# Patient Record
Sex: FEMALE | Race: WHITE | NOT HISPANIC OR LATINO | Employment: FULL TIME | URBAN - METROPOLITAN AREA
[De-identification: names, ages, dates, MRNs, and addresses within clinical notes are randomized per-mention and may not be internally consistent; named-entity substitution may affect disease eponyms.]

---

## 2018-12-03 ENCOUNTER — OFFICE VISIT (OUTPATIENT)
Dept: ORTHOPEDICS | Facility: CLINIC | Age: 23
End: 2018-12-03
Payer: COMMERCIAL

## 2018-12-03 VITALS
WEIGHT: 140 LBS | HEIGHT: 64 IN | HEART RATE: 91 BPM | DIASTOLIC BLOOD PRESSURE: 80 MMHG | BODY MASS INDEX: 23.9 KG/M2 | SYSTOLIC BLOOD PRESSURE: 134 MMHG

## 2018-12-03 DIAGNOSIS — M54.16 LUMBAR RADICULITIS: Primary | ICD-10-CM

## 2018-12-03 DIAGNOSIS — S39.012S STRAIN OF LUMBAR REGION, SEQUELA: ICD-10-CM

## 2018-12-03 PROCEDURE — 72100 X-RAY EXAM L-S SPINE 2/3 VWS: CPT | Mod: ,,, | Performed by: ORTHOPAEDIC SURGERY

## 2018-12-03 PROCEDURE — 72170 X-RAY EXAM OF PELVIS: CPT | Mod: ,,, | Performed by: ORTHOPAEDIC SURGERY

## 2018-12-03 PROCEDURE — 99203 OFFICE O/P NEW LOW 30 MIN: CPT | Mod: ,,, | Performed by: ORTHOPAEDIC SURGERY

## 2018-12-03 RX ORDER — OXAPROZIN 600 MG/1
600 TABLET, FILM COATED ORAL 2 TIMES DAILY
Qty: 40 TABLET | Refills: 0 | Status: SHIPPED | OUTPATIENT
Start: 2018-12-03 | End: 2018-12-23

## 2018-12-03 NOTE — PROGRESS NOTES
Subjective:       Patient ID: Kelsey Doll is a 23 y.o. female.    Chief Complaint: Pain of the Lower Back (Lumbar pain since the end of September. Beginning of October she picked up a heavy pumpkin and felt pain. States pain got worse yesterday. Pain radiates to both sides of back. Has left leg pain, started yesterday. Pain went to left calf yesterday)      History of Present Illness  Acute onset of low back pain about a month ago that occasionally rarely radiates to left leg she is only taken some anti-inflammatories occasionally    Current Medications  No current outpatient medications on file.     No current facility-administered medications for this visit.        Allergies  Review of patient's allergies indicates:  No Known Allergies    Past Medical History  History reviewed. No pertinent past medical history.    Surgical History  History reviewed. No pertinent surgical history.    Family History:   History reviewed. No pertinent family history.    Social History:   Social History     Socioeconomic History    Marital status: Single     Spouse name: Not on file    Number of children: Not on file    Years of education: Not on file    Highest education level: Not on file   Social Needs    Financial resource strain: Not on file    Food insecurity - worry: Not on file    Food insecurity - inability: Not on file    Transportation needs - medical: Not on file    Transportation needs - non-medical: Not on file   Occupational History    Not on file   Tobacco Use    Smoking status: Never Smoker    Smokeless tobacco: Never Used   Substance and Sexual Activity    Alcohol use: Yes    Drug use: Not on file    Sexual activity: Not on file   Other Topics Concern    Not on file   Social History Narrative    Not on file       Hospitalization/Major Diagnostic Procedure:     Review of Systems     General/Constitutional:  Chills denies. Fatigue denies. Fever denies. Weight gain denies. Weight loss  denies.    Respiratory:  Shortness of breath denies.    Cardiovascular:  Chest pain denies.    Gastrointestinal:  Constipation denies. Diarrhea denies. Nausea denies. Vomiting denies.     Hematology:  Easy bruising denies. Prolonged bleeding denies.     Genitourinary:  Frequent urination denies. Pain in lower back denies. Painful urination denies.     Musculoskeletal:  See HPI for details    Skin:  Rash denies.    Neurologic:  Dizziness denies. Gait abnormalities denies. Seizures denies. Tingling/Numbess denies.    Psychiatric:  Anxiety denies. Depressed mood denies.     Objective:   Vital Signs:   Vitals:    12/03/18 0838   BP: 134/80   Pulse: 91        Physical Exam      General Examination:     Constitutional: The patient is alert and oriented to lace person and time. Mood is pleasant.     Head/Face: Normal facial features normal eyebrows    Eyes: Normal extraocular motion bilaterally    Lungs: Respirations are equal and unlabored    Gait is coordinated.    Cardiovascular: There are no swelling or varicosities present.    Lymphatic: Negative for adenopathy    Skin: Normal    Neurological: Level of consciousness normal. Oriented to place person and time and situation    Psychiatric: Oriented to time place person and situation    Tenderness of the left posterior leg spine without spasm and a mild restriction of motion straight leg raising is negative Felix test is negative    XRAY Report/ Interpretation : AP and lateral x-rays of lumbar spine will performed today. Indications low back pain. Findings: Findings appear normal  AP pelvis x-ray was taken today. Indications low back pain and/or hip pain. Findings AP pelvis x-ray appears to be normal with no evidence of fractures or significant degenerative disease      Assessment:       1. Lumbar radiculitis    2. Strain of lumbar region, sequela        Plan:       Kelsey was seen today for pain.    Diagnoses and all orders for this visit:    Lumbar radiculitis  -      X-Ray Lumbar Spine Ap And Lateral  -     X-Ray Pelvis Routine AP    Strain of lumbar region, sequela         No Follow-up on file.    Trilaminar cans and extension exercises and prescription anti-inflammatory advised returns for assessment one week    This note was created using Dragon voice recognition software that occasionally misinterpreted phrases or words.

## 2021-08-11 ENCOUNTER — HOSPITAL ENCOUNTER (EMERGENCY)
Facility: HOSPITAL | Age: 26
Discharge: HOME | End: 2021-08-11
Attending: EMERGENCY MEDICINE
Payer: COMMERCIAL

## 2021-08-11 VITALS
WEIGHT: 140 LBS | SYSTOLIC BLOOD PRESSURE: 154 MMHG | TEMPERATURE: 98 F | HEIGHT: 64 IN | HEART RATE: 64 BPM | OXYGEN SATURATION: 100 % | BODY MASS INDEX: 23.9 KG/M2 | RESPIRATION RATE: 18 BRPM | DIASTOLIC BLOOD PRESSURE: 91 MMHG

## 2021-08-11 DIAGNOSIS — T14.8XXA ABRASION: ICD-10-CM

## 2021-08-11 DIAGNOSIS — S52.124A CLOSED NONDISPLACED FRACTURE OF HEAD OF RIGHT RADIUS, INITIAL ENCOUNTER: Primary | ICD-10-CM

## 2021-08-11 LAB
B-HCG UR QL: NEGATIVE
CTP QC/QA: YES

## 2021-08-11 PROCEDURE — 81025 URINE PREGNANCY TEST: CPT | Performed by: EMERGENCY MEDICINE

## 2021-08-11 PROCEDURE — 25000003 PHARM REV CODE 250: Performed by: EMERGENCY MEDICINE

## 2021-08-11 PROCEDURE — 99283 EMERGENCY DEPT VISIT LOW MDM: CPT

## 2021-08-11 RX ORDER — ONDANSETRON 4 MG/1
4 TABLET, FILM COATED ORAL EVERY 6 HOURS PRN
Qty: 12 TABLET | Refills: 0 | Status: ON HOLD | OUTPATIENT
Start: 2021-08-11 | End: 2022-06-20 | Stop reason: HOSPADM

## 2021-08-11 RX ORDER — HYDROCODONE BITARTRATE AND ACETAMINOPHEN 5; 325 MG/1; MG/1
1 TABLET ORAL EVERY 8 HOURS PRN
Qty: 8 TABLET | Refills: 0 | Status: ON HOLD | OUTPATIENT
Start: 2021-08-11 | End: 2022-06-20 | Stop reason: HOSPADM

## 2021-08-11 RX ORDER — ONDANSETRON 4 MG/1
4 TABLET, FILM COATED ORAL EVERY 6 HOURS
Qty: 12 TABLET | Refills: 0 | Status: SHIPPED | OUTPATIENT
Start: 2021-08-11 | End: 2021-08-11 | Stop reason: SDUPTHER

## 2021-08-11 RX ORDER — CEPHALEXIN 500 MG/1
500 CAPSULE ORAL 4 TIMES DAILY
Qty: 28 CAPSULE | Refills: 0 | Status: SHIPPED | OUTPATIENT
Start: 2021-08-11 | End: 2021-08-18

## 2021-08-11 RX ADMIN — BACITRACIN ZINC, NEOMYCIN, POLYMYXIN B: 400; 3.5; 5 OINTMENT TOPICAL at 05:08

## 2021-08-12 ENCOUNTER — OFFICE VISIT (OUTPATIENT)
Dept: ORTHOPEDICS | Facility: CLINIC | Age: 26
End: 2021-08-12
Payer: COMMERCIAL

## 2021-08-12 VITALS — WEIGHT: 140 LBS | BODY MASS INDEX: 23.9 KG/M2 | HEIGHT: 64 IN | RESPIRATION RATE: 18 BRPM

## 2021-08-12 DIAGNOSIS — S52.134A CLOSED NONDISPLACED FRACTURE OF NECK OF RIGHT RADIUS, INITIAL ENCOUNTER: Primary | ICD-10-CM

## 2021-08-12 PROCEDURE — 3008F BODY MASS INDEX DOCD: CPT | Mod: CPTII,S$GLB,, | Performed by: ORTHOPAEDIC SURGERY

## 2021-08-12 PROCEDURE — 1159F MED LIST DOCD IN RCRD: CPT | Mod: CPTII,S$GLB,, | Performed by: ORTHOPAEDIC SURGERY

## 2021-08-12 PROCEDURE — 1160F RVW MEDS BY RX/DR IN RCRD: CPT | Mod: CPTII,S$GLB,, | Performed by: ORTHOPAEDIC SURGERY

## 2021-08-12 PROCEDURE — 99999 PR PBB SHADOW E&M-NEW PATIENT-LVL III: ICD-10-PCS | Mod: PBBFAC,,, | Performed by: ORTHOPAEDIC SURGERY

## 2021-08-12 PROCEDURE — 1125F PR PAIN SEVERITY QUANTIFIED, PAIN PRESENT: ICD-10-PCS | Mod: CPTII,S$GLB,, | Performed by: ORTHOPAEDIC SURGERY

## 2021-08-12 PROCEDURE — 1160F PR REVIEW ALL MEDS BY PRESCRIBER/CLIN PHARMACIST DOCUMENTED: ICD-10-PCS | Mod: CPTII,S$GLB,, | Performed by: ORTHOPAEDIC SURGERY

## 2021-08-12 PROCEDURE — 1159F PR MEDICATION LIST DOCUMENTED IN MEDICAL RECORD: ICD-10-PCS | Mod: CPTII,S$GLB,, | Performed by: ORTHOPAEDIC SURGERY

## 2021-08-12 PROCEDURE — 1125F AMNT PAIN NOTED PAIN PRSNT: CPT | Mod: CPTII,S$GLB,, | Performed by: ORTHOPAEDIC SURGERY

## 2021-08-12 PROCEDURE — 99203 OFFICE O/P NEW LOW 30 MIN: CPT | Mod: S$GLB,,, | Performed by: ORTHOPAEDIC SURGERY

## 2021-08-12 PROCEDURE — 99203 PR OFFICE/OUTPT VISIT, NEW, LEVL III, 30-44 MIN: ICD-10-PCS | Mod: S$GLB,,, | Performed by: ORTHOPAEDIC SURGERY

## 2021-08-12 PROCEDURE — 99999 PR PBB SHADOW E&M-NEW PATIENT-LVL III: CPT | Mod: PBBFAC,,, | Performed by: ORTHOPAEDIC SURGERY

## 2021-08-12 PROCEDURE — 3008F PR BODY MASS INDEX (BMI) DOCUMENTED: ICD-10-PCS | Mod: CPTII,S$GLB,, | Performed by: ORTHOPAEDIC SURGERY

## 2021-09-10 DIAGNOSIS — S52.134A CLOSED NONDISPLACED FRACTURE OF NECK OF RIGHT RADIUS, INITIAL ENCOUNTER: Primary | ICD-10-CM

## 2021-09-13 ENCOUNTER — HOSPITAL ENCOUNTER (OUTPATIENT)
Dept: RADIOLOGY | Facility: HOSPITAL | Age: 26
Discharge: HOME | End: 2021-09-13
Attending: ORTHOPAEDIC SURGERY
Payer: COMMERCIAL

## 2021-09-13 ENCOUNTER — OFFICE VISIT (OUTPATIENT)
Dept: ORTHOPEDICS | Facility: CLINIC | Age: 26
End: 2021-09-13
Payer: COMMERCIAL

## 2021-09-13 VITALS — WEIGHT: 140 LBS | HEIGHT: 64 IN | RESPIRATION RATE: 18 BRPM | BODY MASS INDEX: 23.9 KG/M2

## 2021-09-13 DIAGNOSIS — S52.134A CLOSED NONDISPLACED FRACTURE OF NECK OF RIGHT RADIUS, INITIAL ENCOUNTER: ICD-10-CM

## 2021-09-13 DIAGNOSIS — S52.134D CLOSED NONDISPLACED FRACTURE OF NECK OF RIGHT RADIUS WITH ROUTINE HEALING, SUBSEQUENT ENCOUNTER: Primary | ICD-10-CM

## 2021-09-13 PROCEDURE — 73080 X-RAY EXAM OF ELBOW: CPT | Mod: TC,PN,RT

## 2021-09-13 PROCEDURE — 1160F RVW MEDS BY RX/DR IN RCRD: CPT | Mod: CPTII,S$GLB,, | Performed by: ORTHOPAEDIC SURGERY

## 2021-09-13 PROCEDURE — 1159F MED LIST DOCD IN RCRD: CPT | Mod: CPTII,S$GLB,, | Performed by: ORTHOPAEDIC SURGERY

## 2021-09-13 PROCEDURE — 73080 X-RAY EXAM OF ELBOW: CPT | Mod: 26,RT,, | Performed by: RADIOLOGY

## 2021-09-13 PROCEDURE — 99213 OFFICE O/P EST LOW 20 MIN: CPT | Mod: S$GLB,,, | Performed by: ORTHOPAEDIC SURGERY

## 2021-09-13 PROCEDURE — 1159F PR MEDICATION LIST DOCUMENTED IN MEDICAL RECORD: ICD-10-PCS | Mod: CPTII,S$GLB,, | Performed by: ORTHOPAEDIC SURGERY

## 2021-09-13 PROCEDURE — 73080 XR ELBOW COMPLETE 3 VIEW RIGHT: ICD-10-PCS | Mod: 26,RT,, | Performed by: RADIOLOGY

## 2021-09-13 PROCEDURE — 99999 PR PBB SHADOW E&M-EST. PATIENT-LVL III: ICD-10-PCS | Mod: PBBFAC,,, | Performed by: ORTHOPAEDIC SURGERY

## 2021-09-13 PROCEDURE — 1160F PR REVIEW ALL MEDS BY PRESCRIBER/CLIN PHARMACIST DOCUMENTED: ICD-10-PCS | Mod: CPTII,S$GLB,, | Performed by: ORTHOPAEDIC SURGERY

## 2021-09-13 PROCEDURE — 99213 PR OFFICE/OUTPT VISIT, EST, LEVL III, 20-29 MIN: ICD-10-PCS | Mod: S$GLB,,, | Performed by: ORTHOPAEDIC SURGERY

## 2021-09-13 PROCEDURE — 3008F PR BODY MASS INDEX (BMI) DOCUMENTED: ICD-10-PCS | Mod: CPTII,S$GLB,, | Performed by: ORTHOPAEDIC SURGERY

## 2021-09-13 PROCEDURE — 99999 PR PBB SHADOW E&M-EST. PATIENT-LVL III: CPT | Mod: PBBFAC,,, | Performed by: ORTHOPAEDIC SURGERY

## 2021-09-13 PROCEDURE — 3008F BODY MASS INDEX DOCD: CPT | Mod: CPTII,S$GLB,, | Performed by: ORTHOPAEDIC SURGERY

## 2021-11-10 LAB
ABO + RH BLD: NORMAL
C TRACH RRNA SPEC QL PROBE: NEGATIVE
HBV SURFACE AG SERPL QL IA: NEGATIVE
HCT VFR BLD AUTO: 44.5 % (ref 36–46)
HGB BLD-MCNC: 14.7 G/DL (ref 12–16)
HIV 1+2 AB+HIV1 P24 AG SERPL QL IA: NEGATIVE
INDIRECT COOMBS: NEGATIVE
N GONORRHOEAE, AMPLIFIED DNA: NEGATIVE
RPR: NORMAL
RUBELLA IMMUNE STATUS: NORMAL

## 2022-04-07 LAB
HCT VFR BLD AUTO: 35.9 % (ref 36–46)
HGB BLD-MCNC: 12.1 G/DL (ref 12–16)

## 2022-04-13 ENCOUNTER — HOSPITAL ENCOUNTER (OUTPATIENT)
Facility: HOSPITAL | Age: 27
Discharge: HOME | End: 2022-04-13
Attending: STUDENT IN AN ORGANIZED HEALTH CARE EDUCATION/TRAINING PROGRAM | Admitting: SPECIALIST
Payer: COMMERCIAL

## 2022-04-13 VITALS
BODY MASS INDEX: 31.41 KG/M2 | RESPIRATION RATE: 18 BRPM | TEMPERATURE: 98 F | HEART RATE: 88 BPM | HEIGHT: 64 IN | WEIGHT: 184 LBS | SYSTOLIC BLOOD PRESSURE: 115 MMHG | DIASTOLIC BLOOD PRESSURE: 74 MMHG | OXYGEN SATURATION: 99 %

## 2022-04-13 DIAGNOSIS — O36.8190 DECREASED FETAL MOVEMENT: ICD-10-CM

## 2022-04-13 PROCEDURE — 76819 FETAL BIOPHYS PROFIL W/O NST: CPT

## 2022-04-14 NOTE — NURSING
Patient presents with c/o decreased fetal movement. Patient states that she has felt very light and sluggish like movement that is abnormal from fetal routine. Gross fetal movement palpated.Strip reactive with kick counts. BPP performed 8/8. D/c home with education.

## 2022-06-09 ENCOUNTER — HOSPITAL ENCOUNTER (OUTPATIENT)
Facility: HOSPITAL | Age: 27
Discharge: HOME | End: 2022-06-09
Attending: STUDENT IN AN ORGANIZED HEALTH CARE EDUCATION/TRAINING PROGRAM | Admitting: STUDENT IN AN ORGANIZED HEALTH CARE EDUCATION/TRAINING PROGRAM
Payer: COMMERCIAL

## 2022-06-09 VITALS — DIASTOLIC BLOOD PRESSURE: 75 MMHG | HEART RATE: 91 BPM | SYSTOLIC BLOOD PRESSURE: 122 MMHG

## 2022-06-09 DIAGNOSIS — O16.9 ELEVATED BLOOD PRESSURE AFFECTING PREGNANCY, ANTEPARTUM: ICD-10-CM

## 2022-06-09 LAB
ALBUMIN SERPL BCP-MCNC: 3.1 G/DL (ref 3.5–5.2)
ALP SERPL-CCNC: 79 U/L (ref 55–135)
ALT SERPL W/O P-5'-P-CCNC: 21 U/L (ref 10–44)
ANION GAP SERPL CALC-SCNC: 11 MMOL/L (ref 8–16)
AST SERPL-CCNC: 23 U/L (ref 10–40)
BACTERIA #/AREA URNS HPF: NEGATIVE /HPF
BILIRUB SERPL-MCNC: 0.3 MG/DL (ref 0.1–1)
BILIRUB UR QL STRIP: NEGATIVE
BUN SERPL-MCNC: 6 MG/DL (ref 6–20)
CALCIUM SERPL-MCNC: 9.3 MG/DL (ref 8.7–10.5)
CHLORIDE SERPL-SCNC: 104 MMOL/L (ref 95–110)
CLARITY UR: CLEAR
CO2 SERPL-SCNC: 21 MMOL/L (ref 23–29)
COLOR UR: COLORLESS
CREAT SERPL-MCNC: 0.5 MG/DL (ref 0.5–1.4)
ERYTHROCYTE [DISTWIDTH] IN BLOOD BY AUTOMATED COUNT: 13.2 % (ref 11.5–14.5)
EST. GFR  (AFRICAN AMERICAN): >60 ML/MIN/1.73 M^2
EST. GFR  (NON AFRICAN AMERICAN): >60 ML/MIN/1.73 M^2
GLUCOSE SERPL-MCNC: 83 MG/DL (ref 70–110)
GLUCOSE UR QL STRIP: NEGATIVE
HCT VFR BLD AUTO: 34.4 % (ref 37–48.5)
HGB BLD-MCNC: 11.8 G/DL (ref 12–16)
HGB UR QL STRIP: NEGATIVE
HYALINE CASTS #/AREA URNS LPF: 3 /LPF
KETONES UR QL STRIP: NEGATIVE
LDH SERPL L TO P-CCNC: 140 U/L (ref 110–260)
LEUKOCYTE ESTERASE UR QL STRIP: ABNORMAL
MCH RBC QN AUTO: 30.6 PG (ref 27–31)
MCHC RBC AUTO-ENTMCNC: 34.3 G/DL (ref 32–36)
MCV RBC AUTO: 89 FL (ref 82–98)
MICROSCOPIC COMMENT: ABNORMAL
NITRITE UR QL STRIP: NEGATIVE
PH UR STRIP: 8 [PH] (ref 5–8)
PLATELET # BLD AUTO: 229 K/UL (ref 150–450)
PMV BLD AUTO: 9.5 FL (ref 9.2–12.9)
POTASSIUM SERPL-SCNC: 3.6 MMOL/L (ref 3.5–5.1)
PROT SERPL-MCNC: 6.2 G/DL (ref 6–8.4)
PROT UR QL STRIP: NEGATIVE
RBC # BLD AUTO: 3.86 M/UL (ref 4–5.4)
RBC #/AREA URNS HPF: 1 /HPF (ref 0–4)
SODIUM SERPL-SCNC: 136 MMOL/L (ref 136–145)
SP GR UR STRIP: 1 (ref 1–1.03)
SQUAMOUS #/AREA URNS HPF: 5 /HPF
URATE SERPL-MCNC: 3.7 MG/DL (ref 2.4–5.7)
URN SPEC COLLECT METH UR: ABNORMAL
UROBILINOGEN UR STRIP-ACNC: NEGATIVE EU/DL
WBC # BLD AUTO: 13.79 K/UL (ref 3.9–12.7)
WBC #/AREA URNS HPF: 3 /HPF (ref 0–5)

## 2022-06-09 PROCEDURE — 59025 FETAL NON-STRESS TEST: CPT

## 2022-06-09 PROCEDURE — 85027 COMPLETE CBC AUTOMATED: CPT | Performed by: STUDENT IN AN ORGANIZED HEALTH CARE EDUCATION/TRAINING PROGRAM

## 2022-06-09 PROCEDURE — 36415 COLL VENOUS BLD VENIPUNCTURE: CPT | Performed by: STUDENT IN AN ORGANIZED HEALTH CARE EDUCATION/TRAINING PROGRAM

## 2022-06-09 PROCEDURE — 81001 URINALYSIS AUTO W/SCOPE: CPT | Performed by: STUDENT IN AN ORGANIZED HEALTH CARE EDUCATION/TRAINING PROGRAM

## 2022-06-09 PROCEDURE — 83615 LACTATE (LD) (LDH) ENZYME: CPT | Performed by: STUDENT IN AN ORGANIZED HEALTH CARE EDUCATION/TRAINING PROGRAM

## 2022-06-09 PROCEDURE — 80053 COMPREHEN METABOLIC PANEL: CPT | Performed by: STUDENT IN AN ORGANIZED HEALTH CARE EDUCATION/TRAINING PROGRAM

## 2022-06-09 PROCEDURE — 84550 ASSAY OF BLOOD/URIC ACID: CPT | Performed by: STUDENT IN AN ORGANIZED HEALTH CARE EDUCATION/TRAINING PROGRAM

## 2022-06-10 ENCOUNTER — HOSPITAL ENCOUNTER (OUTPATIENT)
Facility: HOSPITAL | Age: 27
Discharge: HOME | End: 2022-06-10
Attending: STUDENT IN AN ORGANIZED HEALTH CARE EDUCATION/TRAINING PROGRAM | Admitting: STUDENT IN AN ORGANIZED HEALTH CARE EDUCATION/TRAINING PROGRAM
Payer: COMMERCIAL

## 2022-06-10 VITALS — HEART RATE: 83 BPM | DIASTOLIC BLOOD PRESSURE: 80 MMHG | SYSTOLIC BLOOD PRESSURE: 123 MMHG

## 2022-06-10 DIAGNOSIS — O16.9 HYPERTENSION AFFECTING PREGNANCY: ICD-10-CM

## 2022-06-10 PROCEDURE — 59025 FETAL NON-STRESS TEST: CPT

## 2022-06-14 LAB
PRENATAL STREP B CULTURE: NEGATIVE
PRENATAL STREP B CULTURE: POSITIVE

## 2022-06-16 ENCOUNTER — HOSPITAL ENCOUNTER (INPATIENT)
Facility: HOSPITAL | Age: 27
LOS: 4 days | Discharge: HOME | End: 2022-06-20
Attending: STUDENT IN AN ORGANIZED HEALTH CARE EDUCATION/TRAINING PROGRAM | Admitting: STUDENT IN AN ORGANIZED HEALTH CARE EDUCATION/TRAINING PROGRAM
Payer: COMMERCIAL

## 2022-06-16 DIAGNOSIS — Z34.90 ENCOUNTER FOR INDUCTION OF LABOR: ICD-10-CM

## 2022-06-16 LAB
ABO + RH BLD: NORMAL
ALBUMIN SERPL BCP-MCNC: 3.3 G/DL (ref 3.5–5.2)
ALP SERPL-CCNC: 88 U/L (ref 55–135)
ALT SERPL W/O P-5'-P-CCNC: 32 U/L (ref 10–44)
AMPHET+METHAMPHET UR QL: NEGATIVE
ANION GAP SERPL CALC-SCNC: 10 MMOL/L (ref 8–16)
AST SERPL-CCNC: 29 U/L (ref 10–40)
BACTERIA #/AREA URNS HPF: ABNORMAL /HPF
BARBITURATES UR QL SCN>200 NG/ML: NEGATIVE
BASOPHILS # BLD AUTO: 0.03 K/UL (ref 0–0.2)
BASOPHILS NFR BLD: 0.3 % (ref 0–1.9)
BENZODIAZ UR QL SCN>200 NG/ML: NEGATIVE
BILIRUB SERPL-MCNC: 0.2 MG/DL (ref 0.1–1)
BILIRUB UR QL STRIP: NEGATIVE
BLD GP AB SCN CELLS X3 SERPL QL: NORMAL
BUN SERPL-MCNC: 7 MG/DL (ref 6–20)
BUPRENORPHINE UR QL: NEGATIVE
BZE UR QL SCN: NEGATIVE
CALCIUM SERPL-MCNC: 8.8 MG/DL (ref 8.7–10.5)
CANNABINOIDS UR QL SCN: NEGATIVE
CHLORIDE SERPL-SCNC: 103 MMOL/L (ref 95–110)
CLARITY UR: ABNORMAL
CO2 SERPL-SCNC: 21 MMOL/L (ref 23–29)
COLOR UR: YELLOW
CREAT SERPL-MCNC: 0.5 MG/DL (ref 0.5–1.4)
CREAT UR-MCNC: 23 MG/DL (ref 15–325)
CREAT UR-MCNC: 23 MG/DL (ref 15–325)
DIFFERENTIAL METHOD: ABNORMAL
EOSINOPHIL # BLD AUTO: 0.1 K/UL (ref 0–0.5)
EOSINOPHIL NFR BLD: 0.8 % (ref 0–8)
ERYTHROCYTE [DISTWIDTH] IN BLOOD BY AUTOMATED COUNT: 13.1 % (ref 11.5–14.5)
EST. GFR  (AFRICAN AMERICAN): >60 ML/MIN/1.73 M^2
EST. GFR  (NON AFRICAN AMERICAN): >60 ML/MIN/1.73 M^2
GLUCOSE SERPL-MCNC: 108 MG/DL (ref 70–110)
GLUCOSE UR QL STRIP: NEGATIVE
HCT VFR BLD AUTO: 33.9 % (ref 37–48.5)
HGB BLD-MCNC: 11.8 G/DL (ref 12–16)
HGB UR QL STRIP: NEGATIVE
HYALINE CASTS #/AREA URNS LPF: 7 /LPF
IMM GRANULOCYTES # BLD AUTO: 0.04 K/UL (ref 0–0.04)
IMM GRANULOCYTES NFR BLD AUTO: 0.3 % (ref 0–0.5)
KETONES UR QL STRIP: NEGATIVE
LEUKOCYTE ESTERASE UR QL STRIP: ABNORMAL
LYMPHOCYTES # BLD AUTO: 2.8 K/UL (ref 1–4.8)
LYMPHOCYTES NFR BLD: 23.7 % (ref 18–48)
MCH RBC QN AUTO: 31.1 PG (ref 27–31)
MCHC RBC AUTO-ENTMCNC: 34.8 G/DL (ref 32–36)
MCV RBC AUTO: 89 FL (ref 82–98)
MICROSCOPIC COMMENT: ABNORMAL
MONOCYTES # BLD AUTO: 0.8 K/UL (ref 0.3–1)
MONOCYTES NFR BLD: 6.6 % (ref 4–15)
NEUTROPHILS # BLD AUTO: 8.2 K/UL (ref 1.8–7.7)
NEUTROPHILS NFR BLD: 68.3 % (ref 38–73)
NITRITE UR QL STRIP: NEGATIVE
NRBC BLD-RTO: 0 /100 WBC
OPIATES UR QL SCN: NEGATIVE
PCP UR QL SCN>25 NG/ML: NEGATIVE
PH UR STRIP: 8 [PH] (ref 5–8)
PLATELET # BLD AUTO: 232 K/UL (ref 150–450)
PMV BLD AUTO: 9.8 FL (ref 9.2–12.9)
POTASSIUM SERPL-SCNC: 3.3 MMOL/L (ref 3.5–5.1)
PROT SERPL-MCNC: 6.4 G/DL (ref 6–8.4)
PROT UR QL STRIP: NEGATIVE
PROT UR-MCNC: <6 MG/DL (ref 6–15)
PROT/CREAT UR: NORMAL MG/G{CREAT} (ref 0–0.2)
RBC # BLD AUTO: 3.8 M/UL (ref 4–5.4)
RBC #/AREA URNS HPF: 1 /HPF (ref 0–4)
SARS-COV-2 RDRP RESP QL NAA+PROBE: NEGATIVE
SODIUM SERPL-SCNC: 134 MMOL/L (ref 136–145)
SP GR UR STRIP: 1 (ref 1–1.03)
SQUAMOUS #/AREA URNS HPF: 10 /HPF
TOXICOLOGY INFORMATION: NORMAL
URN SPEC COLLECT METH UR: ABNORMAL
UROBILINOGEN UR STRIP-ACNC: NEGATIVE EU/DL
WBC # BLD AUTO: 11.96 K/UL (ref 3.9–12.7)
WBC #/AREA URNS HPF: 10 /HPF (ref 0–5)

## 2022-06-16 PROCEDURE — 85025 COMPLETE CBC W/AUTO DIFF WBC: CPT | Performed by: STUDENT IN AN ORGANIZED HEALTH CARE EDUCATION/TRAINING PROGRAM

## 2022-06-16 PROCEDURE — 84156 ASSAY OF PROTEIN URINE: CPT | Performed by: STUDENT IN AN ORGANIZED HEALTH CARE EDUCATION/TRAINING PROGRAM

## 2022-06-16 PROCEDURE — U0002 COVID-19 LAB TEST NON-CDC: HCPCS | Performed by: STUDENT IN AN ORGANIZED HEALTH CARE EDUCATION/TRAINING PROGRAM

## 2022-06-16 PROCEDURE — 86592 SYPHILIS TEST NON-TREP QUAL: CPT | Performed by: STUDENT IN AN ORGANIZED HEALTH CARE EDUCATION/TRAINING PROGRAM

## 2022-06-16 PROCEDURE — 80053 COMPREHEN METABOLIC PANEL: CPT | Performed by: STUDENT IN AN ORGANIZED HEALTH CARE EDUCATION/TRAINING PROGRAM

## 2022-06-16 PROCEDURE — 86901 BLOOD TYPING SEROLOGIC RH(D): CPT | Performed by: STUDENT IN AN ORGANIZED HEALTH CARE EDUCATION/TRAINING PROGRAM

## 2022-06-16 PROCEDURE — 25000003 PHARM REV CODE 250: Performed by: STUDENT IN AN ORGANIZED HEALTH CARE EDUCATION/TRAINING PROGRAM

## 2022-06-16 PROCEDURE — 12000002 HC ACUTE/MED SURGE SEMI-PRIVATE ROOM

## 2022-06-16 PROCEDURE — 63600175 PHARM REV CODE 636 W HCPCS: Performed by: STUDENT IN AN ORGANIZED HEALTH CARE EDUCATION/TRAINING PROGRAM

## 2022-06-16 PROCEDURE — 81001 URINALYSIS AUTO W/SCOPE: CPT | Performed by: STUDENT IN AN ORGANIZED HEALTH CARE EDUCATION/TRAINING PROGRAM

## 2022-06-16 PROCEDURE — 80307 DRUG TEST PRSMV CHEM ANLYZR: CPT | Performed by: STUDENT IN AN ORGANIZED HEALTH CARE EDUCATION/TRAINING PROGRAM

## 2022-06-16 RX ORDER — MULTIVITAMIN
1 TABLET ORAL DAILY
COMMUNITY

## 2022-06-16 RX ORDER — OXYTOCIN-SODIUM CHLORIDE 0.9% IV SOLN 30 UNIT/500ML 30-0.9/5 UT/ML-%
0-30 SOLUTION INTRAVENOUS CONTINUOUS
Status: DISCONTINUED | OUTPATIENT
Start: 2022-06-16 | End: 2022-06-18

## 2022-06-16 RX ORDER — MISOPROSTOL 100 MCG
25 TABLET ORAL EVERY 4 HOURS PRN
Status: DISCONTINUED | OUTPATIENT
Start: 2022-06-16 | End: 2022-06-18

## 2022-06-16 RX ORDER — SIMETHICONE 80 MG
1 TABLET,CHEWABLE ORAL 4 TIMES DAILY PRN
Status: DISCONTINUED | OUTPATIENT
Start: 2022-06-16 | End: 2022-06-18 | Stop reason: SDUPTHER

## 2022-06-16 RX ORDER — ONDANSETRON 4 MG/1
8 TABLET, ORALLY DISINTEGRATING ORAL EVERY 8 HOURS PRN
Status: DISCONTINUED | OUTPATIENT
Start: 2022-06-16 | End: 2022-06-18 | Stop reason: SDUPTHER

## 2022-06-16 RX ORDER — TERBUTALINE SULFATE 1 MG/ML
0.25 INJECTION SUBCUTANEOUS
Status: DISCONTINUED | OUTPATIENT
Start: 2022-06-16 | End: 2022-06-18

## 2022-06-16 RX ORDER — CALCIUM CARBONATE 200(500)MG
500 TABLET,CHEWABLE ORAL 3 TIMES DAILY PRN
Status: DISCONTINUED | OUTPATIENT
Start: 2022-06-16 | End: 2022-06-18

## 2022-06-16 RX ORDER — LIDOCAINE HYDROCHLORIDE 10 MG/ML
10 INJECTION INFILTRATION; PERINEURAL ONCE AS NEEDED
Status: DISCONTINUED | OUTPATIENT
Start: 2022-06-16 | End: 2022-06-18

## 2022-06-16 RX ORDER — PROCHLORPERAZINE EDISYLATE 5 MG/ML
5 INJECTION INTRAMUSCULAR; INTRAVENOUS EVERY 6 HOURS PRN
Status: DISCONTINUED | OUTPATIENT
Start: 2022-06-16 | End: 2022-06-18

## 2022-06-16 RX ORDER — SODIUM CHLORIDE, SODIUM LACTATE, POTASSIUM CHLORIDE, CALCIUM CHLORIDE 600; 310; 30; 20 MG/100ML; MG/100ML; MG/100ML; MG/100ML
INJECTION, SOLUTION INTRAVENOUS CONTINUOUS
Status: DISCONTINUED | OUTPATIENT
Start: 2022-06-16 | End: 2022-06-18

## 2022-06-16 RX ADMIN — MISOPROSTOL 25 MCG: 100 TABLET ORAL at 09:06

## 2022-06-16 RX ADMIN — PENICILLIN G POTASSIUM 5 MILLION UNITS: 5000000 INJECTION, POWDER, FOR SOLUTION INTRAMUSCULAR; INTRAVENOUS at 09:06

## 2022-06-16 RX ADMIN — SODIUM CHLORIDE, POTASSIUM CHLORIDE, SODIUM LACTATE AND CALCIUM CHLORIDE: 600; 310; 30; 20 INJECTION, SOLUTION INTRAVENOUS at 08:06

## 2022-06-17 LAB — RPR SER QL: NORMAL

## 2022-06-17 PROCEDURE — 63600175 PHARM REV CODE 636 W HCPCS: Performed by: STUDENT IN AN ORGANIZED HEALTH CARE EDUCATION/TRAINING PROGRAM

## 2022-06-17 PROCEDURE — 25000003 PHARM REV CODE 250: Performed by: STUDENT IN AN ORGANIZED HEALTH CARE EDUCATION/TRAINING PROGRAM

## 2022-06-17 PROCEDURE — 12000002 HC ACUTE/MED SURGE SEMI-PRIVATE ROOM

## 2022-06-17 RX ORDER — ONDANSETRON 2 MG/ML
4 INJECTION INTRAMUSCULAR; INTRAVENOUS ONCE
Status: COMPLETED | OUTPATIENT
Start: 2022-06-17 | End: 2022-06-17

## 2022-06-17 RX ORDER — BUTORPHANOL TARTRATE 2 MG/ML
1 INJECTION INTRAMUSCULAR; INTRAVENOUS EVERY 4 HOURS PRN
Status: DISCONTINUED | OUTPATIENT
Start: 2022-06-17 | End: 2022-06-18

## 2022-06-17 RX ADMIN — DEXTROSE 2.5 MILLION UNITS: 50 INJECTION, SOLUTION INTRAVENOUS at 01:06

## 2022-06-17 RX ADMIN — SODIUM CHLORIDE, SODIUM LACTATE, POTASSIUM CHLORIDE, AND CALCIUM CHLORIDE 500 ML: .6; .31; .03; .02 INJECTION, SOLUTION INTRAVENOUS at 11:06

## 2022-06-17 RX ADMIN — OXYTOCIN 22 MILLI-UNITS/MIN: 10 INJECTION, SOLUTION INTRAMUSCULAR; INTRAVENOUS at 09:06

## 2022-06-17 RX ADMIN — BUTORPHANOL TARTRATE 1 MG: 2 INJECTION, SOLUTION INTRAMUSCULAR; INTRAVENOUS at 09:06

## 2022-06-17 RX ADMIN — OXYTOCIN 2 MILLI-UNITS/MIN: 10 INJECTION, SOLUTION INTRAMUSCULAR; INTRAVENOUS at 07:06

## 2022-06-17 RX ADMIN — MISOPROSTOL 25 MCG: 100 TABLET ORAL at 02:06

## 2022-06-17 RX ADMIN — ONDANSETRON 4 MG: 2 INJECTION INTRAMUSCULAR; INTRAVENOUS at 06:06

## 2022-06-17 RX ADMIN — DEXTROSE 2.5 MILLION UNITS: 50 INJECTION, SOLUTION INTRAVENOUS at 09:06

## 2022-06-17 RX ADMIN — DEXTROSE 2.5 MILLION UNITS: 50 INJECTION, SOLUTION INTRAVENOUS at 05:06

## 2022-06-17 RX ADMIN — PROMETHAZINE HYDROCHLORIDE 12.5 MG: 25 INJECTION INTRAMUSCULAR; INTRAVENOUS at 09:06

## 2022-06-17 RX ADMIN — SODIUM CHLORIDE, POTASSIUM CHLORIDE, SODIUM LACTATE AND CALCIUM CHLORIDE: 600; 310; 30; 20 INJECTION, SOLUTION INTRAVENOUS at 05:06

## 2022-06-17 NOTE — PROGRESS NOTES
[facility]  Obstetrics  Labor Progress Note    Patient Name: Kelsey Rick  MRN: [id number]  Admission Date: 2022  Hospital Length of Stay: 1 days  Attending Physician: Mayra Arcos, *  Primary Care Provider: Primary Doctor No    Subjective:     Principal Problem:  Induction of labor for gestational hypertension    Interval History:  Kelsey is a 27 y.o.  at 37w6d. She is doing well bouncing on yoga ball.  Contractions not very intense yet.    Objective:     Vital Signs (Most Recent):  Temp: 98.2 °F (36.8 °C) (22 1059)  Pulse: 68 (22 1459)  Resp: 18 (22 1059)  BP: 123/73 (22 1459)  SpO2: 98 % (22 0700) Vital Signs (24h Range):  Temp:  [96.4 °F (35.8 °C)-98.2 °F (36.8 °C)] 98.2 °F (36.8 °C)  Pulse:  [68-92] 68  Resp:  [16-18] 18  SpO2:  [98 %] 98 %  BP: (112-157)/() 123/73        There is no height or weight on file to calculate BMI.    FHT:  135 beats per minute, moderate variability, positive accelerations, no decelerations  TOCO:  Q 3-4 minutes    Physical Exam    Cervical Exam:  Dilation:  1  Effacement:  25%  Station: -3  Presentation: Vertex     Significant Labs:  Lab Results   Component Value Date    GROUPTRH O POS 2022    HEPBSAG Negative 11/10/2021    STREPBCULT negative 2022    STREPBCULT POSITIVE 2022       I have personallly reviewed all pertinent lab results from the last 24 hours.  Recent Lab Results     None          Assessment/Plan:     27 y.o. female  at 37w6d for:    -induction of labor:  Status post Cytotec x2.  Diogo too frequently for another Cytotec.  Was initiated on pitocin.  Will recheck in 4 hours and consider placement of a cervical ripening balloon if no cervical change.  -gestational hypertension:  Blood pressure is normotensive to mild range  -GBS positive:  Receiving penicillin prophylaxis  -epidural in request  -fetal heart tones reactive and reassuring    Mayra Arcos MD,  MD  Obstetrics  [facility]

## 2022-06-18 ENCOUNTER — ANESTHESIA EVENT (OUTPATIENT)
Dept: OBSTETRICS AND GYNECOLOGY | Facility: HOSPITAL | Age: 27
End: 2022-06-18
Payer: COMMERCIAL

## 2022-06-18 ENCOUNTER — ANESTHESIA (OUTPATIENT)
Dept: OBSTETRICS AND GYNECOLOGY | Facility: HOSPITAL | Age: 27
End: 2022-06-18
Payer: COMMERCIAL

## 2022-06-18 PROBLEM — O13.3 GESTATIONAL HYPERTENSION, THIRD TRIMESTER: Status: ACTIVE | Noted: 2022-06-18

## 2022-06-18 PROCEDURE — 25000003 PHARM REV CODE 250: Performed by: STUDENT IN AN ORGANIZED HEALTH CARE EDUCATION/TRAINING PROGRAM

## 2022-06-18 PROCEDURE — C1751 CATH, INF, PER/CENT/MIDLINE: HCPCS | Performed by: ANESTHESIOLOGY

## 2022-06-18 PROCEDURE — 12000002 HC ACUTE/MED SURGE SEMI-PRIVATE ROOM

## 2022-06-18 PROCEDURE — 63600175 PHARM REV CODE 636 W HCPCS: Performed by: STUDENT IN AN ORGANIZED HEALTH CARE EDUCATION/TRAINING PROGRAM

## 2022-06-18 PROCEDURE — 72200004 HC VAGINAL DELIVERY LEVEL I

## 2022-06-18 PROCEDURE — 25000003 PHARM REV CODE 250: Performed by: ANESTHESIOLOGY

## 2022-06-18 PROCEDURE — 62326 NJX INTERLAMINAR LMBR/SAC: CPT | Performed by: ANESTHESIOLOGY

## 2022-06-18 PROCEDURE — 27200710 HC EPIDURAL INFUSION PUMP SET: Performed by: ANESTHESIOLOGY

## 2022-06-18 PROCEDURE — 63600175 PHARM REV CODE 636 W HCPCS: Performed by: ANESTHESIOLOGY

## 2022-06-18 PROCEDURE — 51702 INSERT TEMP BLADDER CATH: CPT

## 2022-06-18 RX ORDER — DOCUSATE SODIUM 100 MG/1
200 CAPSULE, LIQUID FILLED ORAL 2 TIMES DAILY PRN
Status: DISCONTINUED | OUTPATIENT
Start: 2022-06-18 | End: 2022-06-20 | Stop reason: HOSPADM

## 2022-06-18 RX ORDER — NALOXONE HCL 0.4 MG/ML
0.4 VIAL (ML) INJECTION SEE ADMIN INSTRUCTIONS
Status: DISCONTINUED | OUTPATIENT
Start: 2022-06-18 | End: 2022-06-18

## 2022-06-18 RX ORDER — SIMETHICONE 80 MG
1 TABLET,CHEWABLE ORAL EVERY 6 HOURS PRN
Status: DISCONTINUED | OUTPATIENT
Start: 2022-06-18 | End: 2022-06-20 | Stop reason: HOSPADM

## 2022-06-18 RX ORDER — METHYLERGONOVINE MALEATE 0.2 MG/ML
200 INJECTION INTRAVENOUS
Status: DISCONTINUED | OUTPATIENT
Start: 2022-06-18 | End: 2022-06-20 | Stop reason: HOSPADM

## 2022-06-18 RX ORDER — HYDROCORTISONE 25 MG/G
CREAM TOPICAL 3 TIMES DAILY PRN
Status: DISCONTINUED | OUTPATIENT
Start: 2022-06-18 | End: 2022-06-20 | Stop reason: HOSPADM

## 2022-06-18 RX ORDER — ONDANSETRON 4 MG/1
8 TABLET, ORALLY DISINTEGRATING ORAL EVERY 8 HOURS PRN
Status: DISCONTINUED | OUTPATIENT
Start: 2022-06-18 | End: 2022-06-20 | Stop reason: HOSPADM

## 2022-06-18 RX ORDER — DIPHENHYDRAMINE HCL 25 MG
25 CAPSULE ORAL EVERY 4 HOURS PRN
Status: DISCONTINUED | OUTPATIENT
Start: 2022-06-18 | End: 2022-06-20 | Stop reason: HOSPADM

## 2022-06-18 RX ORDER — EPHEDRINE SULFATE 50 MG/ML
5 INJECTION, SOLUTION INTRAVENOUS ONCE AS NEEDED
Status: DISCONTINUED | OUTPATIENT
Start: 2022-06-18 | End: 2022-06-18

## 2022-06-18 RX ORDER — OXYCODONE AND ACETAMINOPHEN 10; 325 MG/1; MG/1
1 TABLET ORAL EVERY 4 HOURS PRN
Status: DISCONTINUED | OUTPATIENT
Start: 2022-06-18 | End: 2022-06-20 | Stop reason: HOSPADM

## 2022-06-18 RX ORDER — CARBOPROST TROMETHAMINE 250 UG/ML
250 INJECTION, SOLUTION INTRAMUSCULAR
Status: DISCONTINUED | OUTPATIENT
Start: 2022-06-18 | End: 2022-06-20 | Stop reason: HOSPADM

## 2022-06-18 RX ORDER — OXYTOCIN-SODIUM CHLORIDE 0.9% IV SOLN 30 UNIT/500ML 30-0.9/5 UT/ML-%
30 SOLUTION INTRAVENOUS ONCE
Status: DISCONTINUED | OUTPATIENT
Start: 2022-06-18 | End: 2022-06-20 | Stop reason: HOSPADM

## 2022-06-18 RX ORDER — IBUPROFEN 400 MG/1
800 TABLET ORAL EVERY 6 HOURS PRN
Status: DISCONTINUED | OUTPATIENT
Start: 2022-06-18 | End: 2022-06-20 | Stop reason: HOSPADM

## 2022-06-18 RX ORDER — OXYCODONE AND ACETAMINOPHEN 5; 325 MG/1; MG/1
1 TABLET ORAL EVERY 4 HOURS PRN
Status: DISCONTINUED | OUTPATIENT
Start: 2022-06-18 | End: 2022-06-20 | Stop reason: HOSPADM

## 2022-06-18 RX ORDER — PROCHLORPERAZINE EDISYLATE 5 MG/ML
5 INJECTION INTRAMUSCULAR; INTRAVENOUS EVERY 6 HOURS PRN
Status: DISCONTINUED | OUTPATIENT
Start: 2022-06-18 | End: 2022-06-20 | Stop reason: HOSPADM

## 2022-06-18 RX ORDER — DIPHENHYDRAMINE HYDROCHLORIDE 50 MG/ML
12.5 INJECTION INTRAMUSCULAR; INTRAVENOUS EVERY 4 HOURS PRN
Status: DISCONTINUED | OUTPATIENT
Start: 2022-06-18 | End: 2022-06-18

## 2022-06-18 RX ORDER — ONDANSETRON 2 MG/ML
4 INJECTION INTRAMUSCULAR; INTRAVENOUS ONCE AS NEEDED
Status: DISCONTINUED | OUTPATIENT
Start: 2022-06-18 | End: 2022-06-18

## 2022-06-18 RX ORDER — ROPIVACAINE HYDROCHLORIDE 2 MG/ML
INJECTION, SOLUTION EPIDURAL; INFILTRATION
Status: COMPLETED | OUTPATIENT
Start: 2022-06-18 | End: 2022-06-18

## 2022-06-18 RX ORDER — PRENATAL WITH FERROUS FUM AND FOLIC ACID 3080; 920; 120; 400; 22; 1.84; 3; 20; 10; 1; 12; 200; 27; 25; 2 [IU]/1; [IU]/1; MG/1; [IU]/1; MG/1; MG/1; MG/1; MG/1; MG/1; MG/1; UG/1; MG/1; MG/1; MG/1; MG/1
1 TABLET ORAL DAILY
Status: DISCONTINUED | OUTPATIENT
Start: 2022-06-18 | End: 2022-06-20 | Stop reason: HOSPADM

## 2022-06-18 RX ORDER — FENTANYL/BUPIVACAINE/NS/PF 2MCG/ML-.1
14 PLASTIC BAG, INJECTION (ML) INJECTION CONTINUOUS
Status: DISCONTINUED | OUTPATIENT
Start: 2022-06-18 | End: 2022-06-18

## 2022-06-18 RX ORDER — MISOPROSTOL 200 UG/1
800 TABLET ORAL
Status: DISCONTINUED | OUTPATIENT
Start: 2022-06-18 | End: 2022-06-20 | Stop reason: HOSPADM

## 2022-06-18 RX ADMIN — SODIUM CHLORIDE, SODIUM LACTATE, POTASSIUM CHLORIDE, AND CALCIUM CHLORIDE 500 ML: .6; .31; .03; .02 INJECTION, SOLUTION INTRAVENOUS at 05:06

## 2022-06-18 RX ADMIN — ROPIVACAINE HYDROCHLORIDE 10 ML: 2 INJECTION, SOLUTION EPIDURAL; INFILTRATION at 04:06

## 2022-06-18 RX ADMIN — SODIUM CHLORIDE, POTASSIUM CHLORIDE, SODIUM LACTATE AND CALCIUM CHLORIDE: 600; 310; 30; 20 INJECTION, SOLUTION INTRAVENOUS at 01:06

## 2022-06-18 RX ADMIN — Medication: at 05:06

## 2022-06-18 RX ADMIN — Medication 420 ML/HR: at 04:06

## 2022-06-18 RX ADMIN — DEXTROSE 2.5 MILLION UNITS: 50 INJECTION, SOLUTION INTRAVENOUS at 09:06

## 2022-06-18 RX ADMIN — SODIUM CHLORIDE, SODIUM LACTATE, POTASSIUM CHLORIDE, AND CALCIUM CHLORIDE 1000 ML: .6; .31; .03; .02 INJECTION, SOLUTION INTRAVENOUS at 03:06

## 2022-06-18 RX ADMIN — IBUPROFEN 800 MG: 400 TABLET ORAL at 07:06

## 2022-06-18 RX ADMIN — BENZOCAINE AND LEVOMENTHOL: 200; 5 SPRAY TOPICAL at 05:06

## 2022-06-18 RX ADMIN — OXYTOCIN 1200 MILLI-UNITS/MIN: 10 INJECTION, SOLUTION INTRAMUSCULAR; INTRAVENOUS at 12:06

## 2022-06-18 RX ADMIN — DOCUSATE SODIUM 200 MG: 100 CAPSULE, LIQUID FILLED ORAL at 07:06

## 2022-06-18 RX ADMIN — SODIUM CHLORIDE, POTASSIUM CHLORIDE, SODIUM LACTATE AND CALCIUM CHLORIDE: 600; 310; 30; 20 INJECTION, SOLUTION INTRAVENOUS at 04:06

## 2022-06-18 RX ADMIN — DEXTROSE 2.5 MILLION UNITS: 50 INJECTION, SOLUTION INTRAVENOUS at 01:06

## 2022-06-18 RX ADMIN — DEXTROSE 2.5 MILLION UNITS: 50 INJECTION, SOLUTION INTRAVENOUS at 05:06

## 2022-06-18 RX ADMIN — ONDANSETRON 8 MG: 4 TABLET, ORALLY DISINTEGRATING ORAL at 12:06

## 2022-06-18 NOTE — L&D DELIVERY NOTE
[facility]  Vaginal Delivery   Operative Note    SUMMARY   Patient labored to completion and began to push for spontaneous vaginal delivery.  Fetal head delivered over intact perineum in the OA position.  No nuchal cord.  With gentle downward traction the anterior shoulder delivered without difficulty followed by the posterior shoulder and remaining body.  Fetus was placed on maternal abdomen for skin to skin and bulb suctioned.  Delayed cord clamping was performed and cord blood collected.  Spontaneous delivery of placenta and IV Pitocin initiated with good uterine tone.  First-degree laceration noted, repaired with 2-0 Vicryl.  Hemostasis noted.  Bilateral periurethral lacerations noted, hemostatic and not requiring repair.  The patient tolerated delivery well.  Sponge, needle, and lap count correct. Placenta inspected and noted to be intact.     QBL 115cc    Apgars 8, 9    Indications:  (spontaneous vaginal delivery)  Pregnancy complicated by:   Patient Active Problem List   Diagnosis     (spontaneous vaginal delivery)    Gestational hypertension, third trimester     Admitting GA: 38w0d    Delivery Information for Tyrell Rick    Birth information:  YOB: 2024   Time of birth: 11:45 AM   Sex: female   Head Delivery Date/Time:     Delivery type:    Gestational Age: 38w0d    Delivery Providers    Delivering clinician:            Measurements    Weight:   Length:          Apgars    Living status:   Apgars:  1 min.:  5 min.:  10 min.:  15 min.:  20 min.:    Skin color:         Heart rate:         Reflex irritability:         Muscle tone:         Respiratory effort:         Total:                                Interventions/Resuscitation         Cord    No data filed       Placenta    Placenta delivery date/time:   Placenta removal:            Labor Events:       labor: No     Labor Onset Date/Time: 2022 18:40     Dilation Complete Date/Time: 2022 11:30      Start Pushing Date/Time: 2022 11:45       Start Pushing Date/Time: 2022 11:45     Rupture Date/Time: 22  184         Rupture type: SRM (Spontaneous Rupture)         Fluid Amount:       Fluid Color: Clear               steroids: None     Antibiotics given for GBS: Yes     Induction: misoprostol;oxytocin     Indications for induction:  Hypertension     Augmentation:       Indications for augmentation:       Labor complications: None     Additional complications:          Cervical ripenin2022 9:10 PM      Misoprostol          Delivery:      Episiotomy:       Indication for Episiotomy:       Perineal Lacerations:   Repaired:      Periurethral Laceration:   Repaired:     Labial Laceration:   Repaired:     Sulcus Laceration:   Repaired:     Vaginal Laceration:   Repaired:     Cervical Laceration:   Repaired:     Repair suture:       Repair # of packets:       Last Value - EBL - Nursing (mL):       Sum - EBL - Nursing (mL): 0     Last Value - EBL - Anesthesia (mL):      Calculated QBL (mL):       Vaginal Sweep Performed:       Surgicount Correct:         Other providers:            Details (if applicable):  Trial of Labor      Categorization:      Priority:     Indications for :     Incision Type:       Additional  information:  Forceps:    Vacuum:    Breech:    Observed anomalies    Other (Comments):         Mayra Arcos MD  Obstetrics and Gynecology  [facility]

## 2022-06-18 NOTE — ANESTHESIA PREPROCEDURE EVALUATION
06/18/2022  Kelsey Rick is a 27 y.o., female.      Pre-op Assessment    I have reviewed the Patient Summary Reports.     I have reviewed the Nursing Notes. I have reviewed the NPO Status.   I have reviewed the Medications.     Review of Systems  Anesthesia Hx:  No problems with previous Anesthesia Denies Hx of Anesthetic complications  Neg history of prior surgery. Denies Family Hx of Anesthesia complications.   Denies Personal Hx of Anesthesia complications.   Social:  Non-Smoker, No Alcohol Use    Hematology/Oncology:  Hematology Normal   Oncology Normal     EENT/Dental:EENT/Dental Normal   Cardiovascular:  Cardiovascular Normal     Pulmonary:  Pulmonary Normal    Renal/:  Renal/ Normal     Hepatic/GI:  Hepatic/GI Normal    Musculoskeletal:  Musculoskeletal Normal    Neurological:  Neurology Normal    Endocrine:  Endocrine Normal    Dermatological:  Skin Normal    Psych:  Psychiatric Normal           Physical Exam  General: Well nourished and Alert    Airway:  Mallampati: II   Mouth Opening: Normal  TM Distance: Normal  Tongue: Normal  Neck ROM: Normal ROM    Dental:  Intact    Chest/Lungs:  Clear to auscultation, Normal Respiratory Rate    Heart:  Rate: Normal  Rhythm: Regular Rhythm  Sounds: Normal        Anesthesia Plan  Type of Anesthesia, risks & benefits discussed:    Anesthesia Type: Epidural  Intra-op Monitoring Plan: Standard ASA Monitors  Informed Consent: Informed consent signed with the Patient and all parties understand the risks and agree with anesthesia plan.  All questions answered.   ASA Score: 2    Ready For Surgery From Anesthesia Perspective.     .

## 2022-06-18 NOTE — LACTATION NOTE
06/18/22 1442   Maternal Assessment   Breast Density Bilateral:;soft   Areola Bilateral:;elastic   Nipples Bilateral:;short   Maternal Infant Feeding   Maternal Emotional State assist needed   Infant Positioning clutch/football   Signs of Milk Transfer audible swallow;infant jaw motion present   Pain with Feeding no   Comfort Measures Before/During Feeding infant position adjusted;latch adjusted;maternal position adjusted   Latch Assistance yes     Assisted to latch baby to left breast in football position. Baby latched deeply, nursing well with audible swallows. Mother denies pain during feeding. Reviewed basic breastfeeding instructions and encouraged to call me for any further breastfeeding assistance. Patient verbalizes understanding of all instructions with good recall.    Instructed on proper latch to facilitate effective breastfeeding.  Discussed recognizing hunger cues, appropriate positioning and wide mouth latch.  Discussed ways to determine an effective latch including:  areola included in latch, rhythmic/nutritive sucking and audible swallowing.  Also discussed soreness/tenderness associated with latch and prevention and treatment.  Pt states understanding and verbalized appropriate recall.

## 2022-06-18 NOTE — HOSPITAL COURSE
27-year-old  at 38 weeks and 0 days admitted for induction for gestational hypertension.  Underwent an uncomplicated vaginal delivery.  Received penicillin for GBS prophylaxis.  Blood pressures overall normotensive to occasional mild range throughout her labor course.

## 2022-06-18 NOTE — ANESTHESIA PROCEDURE NOTES
Epidural    Patient location during procedure: OB   Reason for block: primary anesthetic   Reason for block: labor analgesia requested by patient and obstetrician  Diagnosis: Intrauterine pregnancy   Start time: 6/18/2022 4:07 AM  Timeout: 6/18/2022 4:06 AM  End time: 6/18/2022 4:16 AM    Staffing  Performing Provider: Siddharth Hand Jr., MD  Authorizing Provider: Siddharth Hand Jr., MD        Preanesthetic Checklist  Completed: patient identified, IV checked, site marked, risks and benefits discussed, surgical consent, monitors and equipment checked, pre-op evaluation, timeout performed, anesthesia consent given, hand hygiene performed and patient being monitored  Preparation  Patient position: sitting  Prep: Betadine and ChloraPrep  Patient monitoring: ECG and Blood Pressure  Reason for block: primary anesthetic   Epidural  Skin Anesthetic: lidocaine 1%  Skin Wheal: 3 mL  Administration type: continuous  Approach: midline  Interspace: L3-4    Injection technique: DAGO air  Needle and Epidural Catheter  Needle type: Tuohy   Needle gauge: 17  Needle length: 3.5 inches  Catheter type: springwound and multi-orifice  Catheter size: 19 G  Insertion Attempts: 1  Test dose: 3 mL of lidocaine 1.5% with Epi 1-to-200,000  Additional Documentation: incremental injection, negative aspiration for heme and CSF, no paresthesia on injection, no significant pain on injection, no significant complaints from patient and no signs/symptoms of IV or SA injection  Needle localization: anatomical landmarks  Assessment  Upper dermatomal levels - Left: T6  Right: T6   Dermatomal levels determined by pinch or prick  Ease of block: easy  Patient's tolerance of the procedure: no complaints and comfortable throughout block No inadvertent dural puncture with Tuohy.  Dural puncture not performed with spinal needle    Medications:    Medications: ropivacaine (NAROPIN) solution 0.2% - Epidural   10 mL - 6/18/2022 4:12:00 AM

## 2022-06-19 LAB
BASOPHILS # BLD AUTO: 0.04 K/UL (ref 0–0.2)
BASOPHILS NFR BLD: 0.3 % (ref 0–1.9)
DIFFERENTIAL METHOD: ABNORMAL
EOSINOPHIL # BLD AUTO: 0.2 K/UL (ref 0–0.5)
EOSINOPHIL NFR BLD: 1.2 % (ref 0–8)
ERYTHROCYTE [DISTWIDTH] IN BLOOD BY AUTOMATED COUNT: 13.3 % (ref 11.5–14.5)
HCT VFR BLD AUTO: 31.5 % (ref 37–48.5)
HGB BLD-MCNC: 10.7 G/DL (ref 12–16)
IMM GRANULOCYTES # BLD AUTO: 0.1 K/UL (ref 0–0.04)
IMM GRANULOCYTES NFR BLD AUTO: 0.7 % (ref 0–0.5)
LYMPHOCYTES # BLD AUTO: 3.5 K/UL (ref 1–4.8)
LYMPHOCYTES NFR BLD: 22.9 % (ref 18–48)
MCH RBC QN AUTO: 31 PG (ref 27–31)
MCHC RBC AUTO-ENTMCNC: 34 G/DL (ref 32–36)
MCV RBC AUTO: 91 FL (ref 82–98)
MONOCYTES # BLD AUTO: 0.9 K/UL (ref 0.3–1)
MONOCYTES NFR BLD: 5.8 % (ref 4–15)
NEUTROPHILS # BLD AUTO: 10.6 K/UL (ref 1.8–7.7)
NEUTROPHILS NFR BLD: 69.1 % (ref 38–73)
NRBC BLD-RTO: 0 /100 WBC
PLATELET # BLD AUTO: 202 K/UL (ref 150–450)
PMV BLD AUTO: 9.9 FL (ref 9.2–12.9)
RBC # BLD AUTO: 3.45 M/UL (ref 4–5.4)
WBC # BLD AUTO: 15.27 K/UL (ref 3.9–12.7)

## 2022-06-19 PROCEDURE — 12000002 HC ACUTE/MED SURGE SEMI-PRIVATE ROOM

## 2022-06-19 PROCEDURE — 36415 COLL VENOUS BLD VENIPUNCTURE: CPT | Performed by: STUDENT IN AN ORGANIZED HEALTH CARE EDUCATION/TRAINING PROGRAM

## 2022-06-19 PROCEDURE — 85025 COMPLETE CBC W/AUTO DIFF WBC: CPT | Performed by: STUDENT IN AN ORGANIZED HEALTH CARE EDUCATION/TRAINING PROGRAM

## 2022-06-19 PROCEDURE — 25000003 PHARM REV CODE 250: Performed by: STUDENT IN AN ORGANIZED HEALTH CARE EDUCATION/TRAINING PROGRAM

## 2022-06-19 RX ADMIN — DOCUSATE SODIUM 200 MG: 100 CAPSULE, LIQUID FILLED ORAL at 09:06

## 2022-06-19 RX ADMIN — PRENATAL VIT W/ FE FUMARATE-FA TAB 27-0.8 MG 1 TABLET: 27-0.8 TAB at 09:06

## 2022-06-19 NOTE — PLAN OF CARE
VSS. Up ad jodee. Voiding adequately. Lochia small. Motrin effective for pain management. Breastfeeding well. Reviewed POC for the night.     Problem: Adult Inpatient Plan of Care  Goal: Plan of Care Review  Outcome: Ongoing, Progressing  Goal: Patient-Specific Goal (Individualized)  Outcome: Ongoing, Progressing  Goal: Absence of Hospital-Acquired Illness or Injury  Outcome: Ongoing, Progressing  Goal: Optimal Comfort and Wellbeing  Outcome: Ongoing, Progressing  Goal: Readiness for Transition of Care  Outcome: Ongoing, Progressing     Problem:  Fall Injury Risk  Goal: Absence of Fall, Infant Drop and Related Injury  Outcome: Ongoing, Progressing     Problem: Infection  Goal: Absence of Infection Signs and Symptoms  Outcome: Ongoing, Progressing     Problem: Skin Injury Risk Increased  Goal: Skin Health and Integrity  Outcome: Ongoing, Progressing     Problem: Adjustment to Role Transition (Postpartum Vaginal Delivery)  Goal: Successful Maternal Role Transition  Outcome: Ongoing, Progressing     Problem: Bleeding (Postpartum Vaginal Delivery)  Goal: Hemostasis  Outcome: Ongoing, Progressing     Problem: Infection (Postpartum Vaginal Delivery)  Goal: Absence of Infection Signs/Symptoms  Outcome: Ongoing, Progressing     Problem: Pain (Postpartum Vaginal Delivery)  Goal: Acceptable Pain Control  Outcome: Ongoing, Progressing     Problem: Urinary Retention (Postpartum Vaginal Delivery)  Goal: Effective Urinary Elimination  Outcome: Ongoing, Progressing     Problem: Breastfeeding  Goal: Effective Breastfeeding  Outcome: Ongoing, Progressing

## 2022-06-19 NOTE — PLAN OF CARE
Ob screen reviewed and completed        22 1023   OB SCREEN   Assessment Type Discharge Planning Assessment   Source of Information patient;health record   Received Prenatal Care Yes   Any indications/suspicions for None   Is this a teen pregnancy No   Is the baby in NICU No   Indication for adoption/Safe Haven No   Indication for DME/post-acute needs No   HIV (+) No   Any congenital  disorders No   Fetal demise/ death No

## 2022-06-19 NOTE — PLAN OF CARE
Assessment completed: at bedside with mother and father.    Address mother and baby will discharge home to:[address] [id number]    History of Substance Abuse issues:  Mother denies                Assistive Treatment Programs or Medications?  Mother denies    History of Mental Health issues:  Mother denies    History of Domestic Violence:  Mother denies      06/19/22 1024   Discharge Planning   Assessment Type Discharge Planning Assessment   Resource/Environmental Concerns none   Support Systems Spouse/significant other   Equipment Currently Used at Home none   Current Living Arrangements home/apartment/condo   Patient/Family Anticipates Transition to home with family   Patient/Family Anticipated Services at Transition none   DME Needed Upon Discharge  none   Discharge Plan A Home with family   Discharge Plan B Home with family

## 2022-06-19 NOTE — SUBJECTIVE & OBJECTIVE
Interval History: PPD #1     She is doing well this morning. She is tolerating a regular diet without nausea or vomiting. She is voiding spontaneously. She is ambulating. She has passed flatus, and has not a BM. Vaginal bleeding is mild. She denies fever or chills. Abdominal pain is mild and controlled with oral medications. She Is breastfeeding. She desires circumcision for her male baby: not applicable.    Objective:     Vital Signs (Most Recent):  Temp: 98.3 °F (36.8 °C) (06/19/22 0700)  Pulse: 81 (06/19/22 0700)  Resp: 18 (06/19/22 0700)  BP: 127/80 (06/19/22 0700)  SpO2: 97 % (06/19/22 0700) Vital Signs (24h Range):  Temp:  [97.6 °F (36.4 °C)-98.4 °F (36.9 °C)] 98.3 °F (36.8 °C)  Pulse:  [67-86] 81  Resp:  [17-18] 18  SpO2:  [97 %-100 %] 97 %  BP: (116-160)/(69-99) 127/80        There is no height or weight on file to calculate BMI.      Intake/Output Summary (Last 24 hours) at 6/19/2022 1210  Last data filed at 6/18/2022 1930  Gross per 24 hour   Intake --   Output 3115 ml   Net -3115 ml         Significant Labs:  Lab Results   Component Value Date    GROUPTRH O POS 06/16/2022    HEPBSAG Negative 11/10/2021    STREPBCULT negative 06/14/2022    STREPBCULT POSITIVE 06/14/2022     Recent Labs   Lab 06/19/22  0440   HGB 10.7*   HCT 31.5*       I have personallly reviewed all pertinent lab results from the last 24 hours.    Physical Exam

## 2022-06-19 NOTE — PROGRESS NOTES
[facility]  Obstetrics  Postpartum Progress Note    Patient Name: Kelsey Rick  MRN: [id number]  Admission Date: 2022  Hospital Length of Stay: 3 days  Attending Physician: Mayra Arcos, *  Primary Care Provider: Primary Doctor No    Subjective:     Principal Problem: (spontaneous vaginal delivery)    Hospital Course:  27-year-old  at 38 weeks and 0 days admitted for induction for gestational hypertension.  Underwent an uncomplicated vaginal delivery.  Received penicillin for GBS prophylaxis.  Blood pressures overall normotensive to occasional mild range throughout her labor course.      Interval History: PPD #1     She is doing well this morning. She is tolerating a regular diet without nausea or vomiting. She is voiding spontaneously. She is ambulating. She has passed flatus, and has not a BM. Vaginal bleeding is mild. She denies fever or chills. Abdominal pain is mild and controlled with oral medications. She Is breastfeeding. She desires circumcision for her male baby: not applicable.    Objective:     Vital Signs (Most Recent):  Temp: 98.3 °F (36.8 °C) (22 0700)  Pulse: 81 (22 0700)  Resp: 18 (22 0700)  BP: 127/80 (22 0700)  SpO2: 97 % (22 0700) Vital Signs (24h Range):  Temp:  [97.6 °F (36.4 °C)-98.4 °F (36.9 °C)] 98.3 °F (36.8 °C)  Pulse:  [67-86] 81  Resp:  [17-18] 18  SpO2:  [97 %-100 %] 97 %  BP: (116-160)/(69-99) 127/80        There is no height or weight on file to calculate BMI.      Intake/Output Summary (Last 24 hours) at 2022 1210  Last data filed at 2022 1930  Gross per 24 hour   Intake --   Output 3115 ml   Net -3115 ml         Significant Labs:  Lab Results   Component Value Date    GROUPTRH O POS 2022    HEPBSAG Negative 11/10/2021    STREPBCULT negative 2022    STREPBCULT POSITIVE 2022     Recent Labs   Lab 22  0440   HGB 10.7*   HCT 31.5*       I have personallly reviewed all pertinent lab  results from the last 24 hours.    Physical Exam    Assessment/Plan:     27 y.o. female  for:    *  (spontaneous vaginal delivery)  PPD #1   Breast feeding   Home tomorrow due to GGBS positive        Disposition: As patient meets milestones, will plan to discharge tomorrow.    Miley Mckeon MD  Obstetrics  [facility]

## 2022-06-19 NOTE — ANESTHESIA POSTPROCEDURE EVALUATION
Anesthesia Post Evaluation    Patient: Kelsey Rick    Procedure(s) Performed: * No procedures listed *    Final Anesthesia Type: epidural      Patient location during evaluation: floor  Patient participation: Yes- Able to Participate  Level of consciousness: awake and alert, oriented and awake  Post-procedure vital signs: reviewed and stable  Pain management: adequate  Airway patency: patent    PONV status at discharge: No PONV  Anesthetic complications: no      Cardiovascular status: blood pressure returned to baseline, hemodynamically stable and stable  Respiratory status: unassisted, spontaneous ventilation and room air  Hydration status: euvolemic  Follow-up not needed.  Comments: Patient was found to be awake alert oriented x4 without evidence or sedation, confusion or respiratory depression at this time.  She states that she has ambulated well without difficulty and that her legs feel normal at this time.  The patient is able to demonstrate good motor and sensory to her lower extremities at this time.  She is without headache at this time.  Patient does experience mild insertion site discomfort at this time.  She was informed that minor insertion site discomfort is normal and expected.  The epidural site was examined and found to be clean and dry without evidence of bleeding, infection or hematoma formation.  The patient was instructed to notify the Department of Anesthesiology if insertion site discomfort persist, worsens with any change in neurological status.  She is to notify the Department of Anesthesiology with any questions, problems or concerns.  Patient demonstrates no anesthesia complications at this time.          Vitals Value Taken Time   /69 06/19/22 0330   Temp 36.7 °C (98 °F) 06/19/22 0330   Pulse 76 06/19/22 0330   Resp 18 06/19/22 0330   SpO2 98 % 06/19/22 0330         No case tracking events are documented in the log.      Pain/Gabbie Score: Pain Rating Prior to Med Admin: 5  (6/18/2022  7:35 PM)  Pain Rating Post Med Admin: 2 (6/18/2022  8:05 PM)

## 2022-06-20 VITALS
OXYGEN SATURATION: 97 % | TEMPERATURE: 99 F | RESPIRATION RATE: 18 BRPM | BODY MASS INDEX: 31.41 KG/M2 | HEART RATE: 83 BPM | DIASTOLIC BLOOD PRESSURE: 91 MMHG | WEIGHT: 184 LBS | SYSTOLIC BLOOD PRESSURE: 149 MMHG | HEIGHT: 64 IN

## 2022-06-20 PROCEDURE — 25000003 PHARM REV CODE 250: Performed by: STUDENT IN AN ORGANIZED HEALTH CARE EDUCATION/TRAINING PROGRAM

## 2022-06-20 RX ORDER — IBUPROFEN 800 MG/1
800 TABLET ORAL EVERY 6 HOURS PRN
Qty: 30 TABLET | Refills: 0 | Status: SHIPPED | OUTPATIENT
Start: 2022-06-20 | End: 2024-03-16

## 2022-06-20 RX ADMIN — DOCUSATE SODIUM 200 MG: 100 CAPSULE, LIQUID FILLED ORAL at 09:06

## 2022-06-20 RX ADMIN — PRENATAL VIT W/ FE FUMARATE-FA TAB 27-0.8 MG 1 TABLET: 27-0.8 TAB at 09:06

## 2022-06-20 RX ADMIN — IBUPROFEN 800 MG: 400 TABLET ORAL at 09:06

## 2022-06-20 NOTE — DISCHARGE INSTRUCTIONS

## 2022-06-20 NOTE — PLAN OF CARE
Problem: Adult Inpatient Plan of Care  Goal: Plan of Care Review  Outcome: Ongoing, Progressing  Goal: Absence of Hospital-Acquired Illness or Injury  Outcome: Ongoing, Progressing  Goal: Optimal Comfort and Wellbeing  Outcome: Ongoing, Progressing  Goal: Readiness for Transition of Care  Outcome: Ongoing, Progressing     Problem: Infection  Goal: Absence of Infection Signs and Symptoms  Outcome: Ongoing, Progressing     Problem: Skin Injury Risk Increased  Goal: Skin Health and Integrity  Outcome: Ongoing, Progressing     Problem: Adjustment to Role Transition (Postpartum Vaginal Delivery)  Goal: Successful Maternal Role Transition  Outcome: Ongoing, Progressing     Problem: Bleeding (Postpartum Vaginal Delivery)  Goal: Hemostasis  Outcome: Ongoing, Progressing     Problem: Infection (Postpartum Vaginal Delivery)  Goal: Absence of Infection Signs/Symptoms  Outcome: Ongoing, Progressing     Problem: Pain (Postpartum Vaginal Delivery)  Goal: Acceptable Pain Control  Outcome: Ongoing, Progressing     Problem: Urinary Retention (Postpartum Vaginal Delivery)  Goal: Effective Urinary Elimination  Outcome: Ongoing, Progressing     Problem: Breastfeeding  Goal: Effective Breastfeeding  Outcome: Ongoing, Progressing

## 2022-06-21 NOTE — DISCHARGE SUMMARY
[facility]  Obstetrics  Discharge Summary      Patient Name: Kelsey Rick  MRN: [id number]  Admission Date: 2022  Hospital Length of Stay: 4 days  Discharge Date and Time: 2022  1:05 PM  Attending Physician: Nayely att. providers found   Discharging Provider: Mayra Arcos MD, MD   Primary Care Provider: Primary Doctor No      Hospital Course:   27-year-old  at 38 weeks and 0 days admitted for induction for gestational hypertension.  Underwent an uncomplicated vaginal delivery.  Received penicillin for GBS prophylaxis.  Blood pressures overall normotensive to occasional mild range throughout her labor course.    Mother and infant recovered well.  Blood pressures remained stable.  On postpartum day 2 she was meeting all discharge criteria. Pain well controlled, tolerating regular diet, ambulating and voiding without difficulty, passing flatus, no heavy bleeding. VSS and exam reassuring with fundus firm, appropriate abdominal tenderness, appropriate lochia, no evidence of DVT.  Precautions reviewed with patient she will follow-up with me in the office.      Final Active Diagnoses:    Diagnosis Date Noted POA    PRINCIPAL PROBLEM:   (spontaneous vaginal delivery) [O80] 2022 Not Applicable    Gestational hypertension, third trimester [O13.3] 2022 Unknown      Problems Resolved During this Admission:        Significant Diagnostic Studies:     Recent Labs   Lab 22  0440   HGB 10.7*   HCT 31.5*        Lab Results   Component Value Date    GROUPTRH O POS 2022         Feeding Method: breast    Immunizations     Date Immunization Status Dose Route/Site Given by    22 MMR Deferred 0.5 mL Subcutaneous/ Helena Marino RN    22 Tdap Deferred 0.5 mL Intramuscular/ Helena Marino RN          Delivery:    Episiotomy: None   Lacerations: 1st   Repair suture:     Repair # of packets: 1   Blood loss (ml):       Birth information:  YOB: 2024    Time of birth: 11:45 AM   Sex: female   Delivery type: Vaginal, Spontaneous   Gestational Age: 38w0d    Delivery Clinician:      Other providers:       Additional  information:  Forceps:    Vacuum:    Breech:    Observed anomalies Girl Kelsey Rick born at 38w0d  Infant female was born on 2024 at 11:45 AM via Vaginal, Spontaneous. The mother is a 27 y.o.   . She  has no past medical history on file.    Apgars 8 at one minute and 9 at 5 minutes.    Maternal meds include none.  ROM 17 hours PTD.  Mom positive for GBBS and was adequately treated with PCN.  Maternal labs negative for HIV, HepB, RPR, GC, Chlamydia, and Rubella I. There is no history of maternal substance abuse. Moms blood type O pos, Baby is O neg grady neg.  Birth weight 3275 grams.  6.8% loss today. Baby is voiding and stooling. F/U 10:40 tomorrow.  Passed hearing, passed CCHD, TCB 4.5.          Living?:           APGARS  One minute Five minutes Ten minutes   Skin color:         Heart rate:         Grimace:         Muscle tone:         Breathing:         Totals: 8  9        Placenta: Delivered:       appearance    Pending Diagnostic Studies:     None          Discharged Condition: good    Disposition: Home or Self Care    Follow Up:   Follow-up Information     Mayra Arcos MD, MD Follow up in 1 week(s).    Specialty: Obstetrics and Gynecology  Why: BP check, Postpartum check  Contact information:  [address] [place] [address]  [phone]                       Patient Instructions:      Diet Adult Regular     Pelvic Rest     Notify your health care provider if you experience any of the following:  temperature >100.4     Notify your health care provider if you experience any of the following:  persistent nausea and vomiting or diarrhea     Notify your health care provider if you experience any of the following:  severe uncontrolled pain     Notify your health care provider if you experience any of the following:   redness, tenderness, or signs of infection (pain, swelling, redness, odor or green/yellow discharge around incision site)     Notify your health care provider if you experience any of the following:  difficulty breathing or increased cough     Notify your health care provider if you experience any of the following:  severe persistent headache     Notify your health care provider if you experience any of the following:  worsening rash     Notify your health care provider if you experience any of the following:  persistent dizziness, light-headedness, or visual disturbances     Notify your health care provider if you experience any of the following:  increased confusion or weakness     Activity as tolerated     Medications:  Discharge Medication List as of 6/20/2022 11:01 AM      START taking these medications    Details   ibuprofen (ADVIL,MOTRIN) 800 MG tablet Take 1 tablet (800 mg total) by mouth every 6 (six) hours as needed (cramping)., Starting Mon 6/20/2022, Print         CONTINUE these medications which have NOT CHANGED    Details   multivitamin (ONE DAILY MULTIVITAMIN) per tablet Take 1 tablet by mouth once daily., Historical Med         STOP taking these medications       HYDROcodone-acetaminophen (NORCO) 5-325 mg per tablet Comments:   Reason for Stopping:         ondansetron (ZOFRAN) 4 MG tablet Comments:   Reason for Stopping:               Mayra Arcos MD, MD  Obstetrics  [facility]

## 2022-09-19 PROBLEM — O13.3 GESTATIONAL HYPERTENSION, THIRD TRIMESTER: Status: RESOLVED | Noted: 2022-06-18 | Resolved: 2022-09-19

## 2024-03-16 ENCOUNTER — HOSPITAL ENCOUNTER (EMERGENCY)
Facility: HOSPITAL | Age: 29
Discharge: HOME | End: 2024-03-16
Attending: EMERGENCY MEDICINE
Payer: COMMERCIAL

## 2024-03-16 VITALS
WEIGHT: 160 LBS | HEIGHT: 64 IN | RESPIRATION RATE: 16 BRPM | TEMPERATURE: 98 F | DIASTOLIC BLOOD PRESSURE: 88 MMHG | HEART RATE: 72 BPM | SYSTOLIC BLOOD PRESSURE: 137 MMHG | OXYGEN SATURATION: 100 % | BODY MASS INDEX: 27.31 KG/M2

## 2024-03-16 DIAGNOSIS — O20.0 THREATENED MISCARRIAGE IN EARLY PREGNANCY: Primary | ICD-10-CM

## 2024-03-16 LAB
ABO + RH BLD: NORMAL
ALBUMIN SERPL BCP-MCNC: 5 G/DL (ref 3.5–5.2)
ALP SERPL-CCNC: 52 U/L (ref 55–135)
ALT SERPL W/O P-5'-P-CCNC: 14 U/L (ref 10–44)
ANION GAP SERPL CALC-SCNC: 7 MMOL/L (ref 8–16)
AST SERPL-CCNC: 20 U/L (ref 10–40)
B-HCG UR QL: POSITIVE
BACTERIA #/AREA URNS HPF: NORMAL /HPF
BASOPHILS # BLD AUTO: 0.06 K/UL (ref 0–0.2)
BASOPHILS NFR BLD: 0.5 % (ref 0–1.9)
BILIRUB SERPL-MCNC: 0.6 MG/DL (ref 0.1–1)
BILIRUB UR QL STRIP: NEGATIVE
BUN SERPL-MCNC: 7 MG/DL (ref 6–20)
CALCIUM SERPL-MCNC: 10.2 MG/DL (ref 8.7–10.5)
CHLORIDE SERPL-SCNC: 104 MMOL/L (ref 95–110)
CLARITY UR: CLEAR
CO2 SERPL-SCNC: 25 MMOL/L (ref 23–29)
COLOR UR: COLORLESS
CREAT SERPL-MCNC: 0.6 MG/DL (ref 0.5–1.4)
CTP QC/QA: YES
DIFFERENTIAL METHOD BLD: ABNORMAL
EOSINOPHIL # BLD AUTO: 0.2 K/UL (ref 0–0.5)
EOSINOPHIL NFR BLD: 1.5 % (ref 0–8)
ERYTHROCYTE [DISTWIDTH] IN BLOOD BY AUTOMATED COUNT: 12.6 % (ref 11.5–14.5)
EST. GFR  (NO RACE VARIABLE): >60 ML/MIN/1.73 M^2
GLUCOSE SERPL-MCNC: 92 MG/DL (ref 70–110)
GLUCOSE UR QL STRIP: NEGATIVE
HCG INTACT+B SERPL-ACNC: NORMAL MIU/ML
HCT VFR BLD AUTO: 44.4 % (ref 37–48.5)
HGB BLD-MCNC: 14.9 G/DL (ref 12–16)
HGB UR QL STRIP: ABNORMAL
IMM GRANULOCYTES # BLD AUTO: 0.04 K/UL (ref 0–0.04)
IMM GRANULOCYTES NFR BLD AUTO: 0.3 % (ref 0–0.5)
KETONES UR QL STRIP: NEGATIVE
LEUKOCYTE ESTERASE UR QL STRIP: NEGATIVE
LYMPHOCYTES # BLD AUTO: 2.7 K/UL (ref 1–4.8)
LYMPHOCYTES NFR BLD: 23.5 % (ref 18–48)
MCH RBC QN AUTO: 31 PG (ref 27–31)
MCHC RBC AUTO-ENTMCNC: 33.6 G/DL (ref 32–36)
MCV RBC AUTO: 93 FL (ref 82–98)
MICROSCOPIC COMMENT: NORMAL
MONOCYTES # BLD AUTO: 0.7 K/UL (ref 0.3–1)
MONOCYTES NFR BLD: 5.8 % (ref 4–15)
NEUTROPHILS # BLD AUTO: 7.9 K/UL (ref 1.8–7.7)
NEUTROPHILS NFR BLD: 68.4 % (ref 38–73)
NITRITE UR QL STRIP: NEGATIVE
NRBC BLD-RTO: 0 /100 WBC
PH UR STRIP: 7 [PH] (ref 5–8)
PLATELET # BLD AUTO: 348 K/UL (ref 150–450)
PMV BLD AUTO: 9.4 FL (ref 9.2–12.9)
POTASSIUM SERPL-SCNC: 3.9 MMOL/L (ref 3.5–5.1)
PROT SERPL-MCNC: 8.2 G/DL (ref 6–8.4)
PROT UR QL STRIP: NEGATIVE
RBC # BLD AUTO: 4.8 M/UL (ref 4–5.4)
RBC #/AREA URNS HPF: 1 /HPF (ref 0–4)
SODIUM SERPL-SCNC: 136 MMOL/L (ref 136–145)
SP GR UR STRIP: <1.005 (ref 1–1.03)
SQUAMOUS #/AREA URNS HPF: 1 /HPF
URN SPEC COLLECT METH UR: ABNORMAL
UROBILINOGEN UR STRIP-ACNC: NEGATIVE EU/DL
WBC # BLD AUTO: 11.55 K/UL (ref 3.9–12.7)
WBC #/AREA URNS HPF: 1 /HPF (ref 0–5)

## 2024-03-16 PROCEDURE — 96361 HYDRATE IV INFUSION ADD-ON: CPT

## 2024-03-16 PROCEDURE — 99284 EMERGENCY DEPT VISIT MOD MDM: CPT | Mod: 25

## 2024-03-16 PROCEDURE — 86901 BLOOD TYPING SEROLOGIC RH(D): CPT | Performed by: NURSE PRACTITIONER

## 2024-03-16 PROCEDURE — 80053 COMPREHEN METABOLIC PANEL: CPT | Performed by: NURSE PRACTITIONER

## 2024-03-16 PROCEDURE — 85025 COMPLETE CBC W/AUTO DIFF WBC: CPT | Performed by: NURSE PRACTITIONER

## 2024-03-16 PROCEDURE — 96360 HYDRATION IV INFUSION INIT: CPT

## 2024-03-16 PROCEDURE — 84702 CHORIONIC GONADOTROPIN TEST: CPT | Performed by: NURSE PRACTITIONER

## 2024-03-16 PROCEDURE — 81001 URINALYSIS AUTO W/SCOPE: CPT | Performed by: NURSE PRACTITIONER

## 2024-03-16 PROCEDURE — 25000003 PHARM REV CODE 250: Performed by: NURSE PRACTITIONER

## 2024-03-16 PROCEDURE — 81025 URINE PREGNANCY TEST: CPT | Performed by: NURSE PRACTITIONER

## 2024-03-16 RX ADMIN — SODIUM CHLORIDE 1000 ML: 9 INJECTION, SOLUTION INTRAVENOUS at 11:03

## 2024-03-16 NOTE — DISCHARGE INSTRUCTIONS
Call Dr. Arcos's office on Monday.  Have a repeat beta-hCG at that time.  Return to the ED for any worsening of symptoms or any other concerns.  If you have any questions you can call me I will be here till 6:00 a.m. tonight

## 2024-03-16 NOTE — ED PROVIDER NOTES
Encounter Date: 3/16/2024       History     Chief Complaint   Patient presents with    Vaginal Bleeding     Pt about 7 weeks pregnant and started bleeding this morning.      Presents with complaint of vaginal bleeding.  Onset this morning.  Patient reports she is approximately 6 weeks pregnant.  She reports seeing Dr. Arcos about 2 weeks ago and had an ultrasound at that time.  Patient denies abdominal cramping she reports she has a light bleeding that is brown in color.  Denies fever nausea vomiting or diarrhea      Review of patient's allergies indicates:  No Known Allergies  No past medical history on file.  Past Surgical History:   Procedure Laterality Date    TONSILLECTOMY      WISDOM TOOTH EXTRACTION       No family history on file.  Social History     Tobacco Use    Smoking status: Never    Smokeless tobacco: Never   Substance Use Topics    Alcohol use: Not Currently    Drug use: Never     Review of Systems   Constitutional:  Negative for fever.   Respiratory:  Negative for cough, shortness of breath and wheezing.    Cardiovascular:  Negative for chest pain, palpitations and leg swelling.   Gastrointestinal:  Negative for abdominal pain, diarrhea, nausea and vomiting.   Genitourinary:  Positive for vaginal bleeding. Negative for difficulty urinating, dysuria, frequency, hematuria, pelvic pain, vaginal discharge and vaginal pain.   Musculoskeletal:  Negative for back pain and gait problem.   Skin:  Negative for rash.   Neurological:  Negative for dizziness and weakness.       Physical Exam     Initial Vitals [03/16/24 1125]   BP Pulse Resp Temp SpO2   (!) 203/113 88 18 97.5 °F (36.4 °C) 100 %      MAP       --         Physical Exam    Constitutional: She appears well-developed and well-nourished.   HENT:   Head: Normocephalic.   Mouth/Throat: Oropharynx is clear and moist.   Eyes: Conjunctivae are normal.   Neck: Neck supple.   Normal range of motion.  Cardiovascular:  Normal rate, regular rhythm and normal  heart sounds.           Pulmonary/Chest: Breath sounds normal. No respiratory distress.   Abdominal: Abdomen is soft. Bowel sounds are normal. She exhibits no distension. There is no abdominal tenderness.   Genitourinary:    No vaginal discharge.      Genitourinary Comments: There is very scant bleeding.  It is brown in color.  Her os is closed.  Negative for cervical motion tenderness or adnexal tenderness.     Musculoskeletal:         General: Normal range of motion.      Cervical back: Normal range of motion and neck supple.      Comments: Patient is ambulatory per self her gait is steady     Neurological: She is alert and oriented to person, place, and time. No sensory deficit. GCS score is 15. GCS eye subscore is 4. GCS verbal subscore is 5. GCS motor subscore is 6.   Skin: Skin is warm and dry. Capillary refill takes less than 2 seconds.   Psychiatric: She has a normal mood and affect. Thought content normal.         ED Course   Procedures  Labs Reviewed   CBC W/ AUTO DIFFERENTIAL - Abnormal; Notable for the following components:       Result Value    Gran # (ANC) 7.9 (*)     All other components within normal limits    Narrative:     Release to patient->Immediate   COMPREHENSIVE METABOLIC PANEL - Abnormal; Notable for the following components:    Alkaline Phosphatase 52 (*)     Anion Gap 7 (*)     All other components within normal limits    Narrative:     Release to patient->Immediate   URINALYSIS, REFLEX TO URINE CULTURE - Abnormal; Notable for the following components:    Color, UA Colorless (*)     Specific Gravity, UA <1.005 (*)     Occult Blood UA 2+ (*)     All other components within normal limits    Narrative:     Specimen Source->Urine   POCT URINE PREGNANCY - Abnormal; Notable for the following components:    POC Preg Test, Ur Positive (*)     All other components within normal limits   HCG, QUANTITATIVE    Narrative:     Release to patient->Immediate   URINALYSIS MICROSCOPIC    Narrative:      Specimen Source->Urine   GROUP & RH          Imaging Results              US OB <14 Wks TransAbd & TransVag, Single Gestation (XPD) (Final result)  Result time 03/16/24 12:48:28      Final result by Srinath De Anda MD (03/16/24 12:48:28)                   Narrative:    US OB LIMITED WITH TRANSVAGINAL    CLINICAL HISTORY:  28 years Female Vag Bleeding + pregnancy test. hcg pending    FINDINGS: The uterus measures 8.3 x 5.1 x 5.7 cm. Intrauterine fluid collection with yolk sac and fetal pole. Crown-rump length of 4.3 mm, corresponding to an estimated gestational age of 6 weeks 1 day. No fetal cardiac activity was documented.    Right ovary measures 2.7 x 1.8 x 2.1 cm. Appropriate Doppler flow to the right ovary was documented. The left ovary measures 2.4 x 2.1 x 1.5 cm. Appropriate Doppler flow to the left ovary was documented.    Small-volume free fluid in the cul-de-sac.    Impression:    Intrauterine pregnancy with estimated gestational age 6 weeks 1 day (+/-4 days).  This could be a normal, very early pregnancy, although no cardiac activity was documented. Short interval follow-up sonography is recommended.    Small-volume free fluid in the pelvis.    Electronically signed by:  Srinath De Anda MD  03/16/2024 12:48 PM CDT Workstation: [id number]                                     Medications   sodium chloride 0.9% bolus 1,000 mL 1,000 mL (0 mLs Intravenous Stopped 3/16/24 0222)     Medical Decision Making  Presents with complaint of waking this morning to vaginal bleeding.  She reports it is light brown and very light in her mouth.  She said she noticed it when she wipes.  She denies fever nausea vomiting or diarrhea she also denies abdominal cramping.  Patient reports she was seen by Dr. Arcos about 2 weeks ago and at that time she was told she was approximately 6 weeks pregnant.  She had an ultrasound at that time and reports there was a heartbeat.    Amount and/or Complexity of Data Reviewed  Labs:  ordered.     Details: This patient is labs are within normal limits.  Her beta quant is 15,170.    Radiology: ordered.     Details: The radiology report of the transvaginal ultrasound states that this has a 6 week 1 day intrauterine gestation.  He reports it there was no cardiac activity documented but this could be a very early pregnancy.  He recommends a short interval follow-up sonogram.  Discussion of management or test interpretation with external provider(s): I have secure message Dr. Mckeon with the results for this patient.  She wants her to follow up with Dr. Arcos this coming week.  I have instructed the patient to follow up on Monday with a follow-up beta-hCG and possibly another ultrasound.  She is to call and make an appoint with Dr. Arcos.  I have instructed this patient to call me as I would be here till 6:00 a.m. tonight if she had any questions that she thought of after getting home.  At no time while in the ED did this patient appear to be in any acute distress.  She was given return precautions.                                      Clinical Impression:  Final diagnoses:  [O20.0] Threatened miscarriage in early pregnancy (Primary)          ED Disposition Condition    Discharge Stable          ED Prescriptions    None       Follow-up Information       Follow up With Specialties Details Why Contact Info    Mayra Arcos MD Obstetrics and Gynecology In 2 days  [address] [place] [address]  [phone]               Mary Parmar, BRENNAN  03/16/24 6909

## 2024-06-19 LAB
C TRACH RRNA SPEC QL PROBE: NEGATIVE
HBV SURFACE AG SERPL QL IA: NEGATIVE
HCV AB SERPL QL IA: NON REACTIVE
HIV 1+2 AB+HIV1 P24 AG SERPL QL IA: NON REACTIVE
N GONORRHOEAE, AMPLIFIED DNA: NEGATIVE
RPR: NON REACTIVE
RUBELLA IMMUNE STATUS: NORMAL

## 2024-11-05 LAB
ANTIBODY SCREEN: NEGATIVE
GLUCOSE SERPL-MCNC: 115 MG/DL
GLUCOSE, FASTING: 80 MG/DL
HBV SURFACE AG SERPL QL IA: NEGATIVE
HIV 1+2 AB+HIV1 P24 AG SERPL QL IA: NON REACTIVE
RPR: NON REACTIVE

## 2025-01-10 DIAGNOSIS — O14.90 PREECLAMPSIA: Primary | ICD-10-CM

## 2025-01-14 ENCOUNTER — HOSPITAL ENCOUNTER (OUTPATIENT)
Facility: HOSPITAL | Age: 30
Discharge: HOME | End: 2025-01-14
Attending: STUDENT IN AN ORGANIZED HEALTH CARE EDUCATION/TRAINING PROGRAM | Admitting: STUDENT IN AN ORGANIZED HEALTH CARE EDUCATION/TRAINING PROGRAM
Payer: COMMERCIAL

## 2025-01-14 VITALS — HEART RATE: 96 BPM | RESPIRATION RATE: 18 BRPM | DIASTOLIC BLOOD PRESSURE: 65 MMHG | SYSTOLIC BLOOD PRESSURE: 114 MMHG

## 2025-01-14 DIAGNOSIS — O14.90 PREECLAMPSIA: ICD-10-CM

## 2025-01-14 PROCEDURE — 59025 FETAL NON-STRESS TEST: CPT

## 2025-01-14 NOTE — DISCHARGE INSTRUCTIONS
Keep your scheduled appointment with your provider.    Call your Doctor if you have any of the following:  Temperature above 100 degrees  Nausea, vomiting and/or diarrhea  Severe headache, dizziness, or blurred vision  Notable increase in swelling of hands or feet  Notable swelling of face and lips  Difficulty, pain or burning with urination  Foul smelling vaginal discharge  Decreased fetal movement    Come to the hospital if you have any of the following symptoms:  Your water breaks  More than 4-6 contractions in 1 hour for 2 or more hours  Vaginal bleeding like a period    After 28 weeks, you should feel 10 distinct fetal movements within a 2 hour period.    It is recommended that you drink 1/2 a gallon of water each day.  Tea, Soda and Juice are  in addition to this.    Labor and Delivery Phone number: [phone]    If you need to be seen on Labor and delivery between the hours of 6pm and 7am, please enter through the Emergency room entrance.

## 2025-01-16 LAB — PRENATAL STREP B CULTURE: POSITIVE

## 2025-01-17 ENCOUNTER — HOSPITAL ENCOUNTER (OUTPATIENT)
Facility: HOSPITAL | Age: 30
Discharge: HOME | End: 2025-01-17
Attending: STUDENT IN AN ORGANIZED HEALTH CARE EDUCATION/TRAINING PROGRAM | Admitting: STUDENT IN AN ORGANIZED HEALTH CARE EDUCATION/TRAINING PROGRAM
Payer: COMMERCIAL

## 2025-01-17 VITALS — SYSTOLIC BLOOD PRESSURE: 135 MMHG | RESPIRATION RATE: 18 BRPM | HEART RATE: 95 BPM | DIASTOLIC BLOOD PRESSURE: 73 MMHG

## 2025-01-17 DIAGNOSIS — O14.90 PREECLAMPSIA: ICD-10-CM

## 2025-01-17 PROCEDURE — 59025 FETAL NON-STRESS TEST: CPT

## 2025-01-17 NOTE — DISCHARGE INSTRUCTIONS
Keep your scheduled appointment with your provider.    Call your Doctor if you have any of the following:  Temperature above 100 degrees  Nausea, vomiting and/or diarrhea  Severe headache, dizziness, or blurred vision  Notable increase in swelling of hands or feet  Notable swelling of face and lips  Difficulty, pain or burning with urination  Foul smelling vaginal discharge  Decreased fetal movement    Come to the hospital if you have any of the following symptoms:  Your water breaks  More than 4-6 contractions in 1 hour for 2 or more hours  Vaginal bleeding like a period    After 28 weeks, you should feel 10 distinct fetal movements within a 2 hour period.    It is recommended that you drink 1/2 a gallon of water each day.  Tea, Soda and Juice are  in addition to this.    Labor and Delivery Phone number: [phone]    If you need to be seen on Labor and delivery between the hours of 6pm and 7am, please enter through the Emergency room entrance.

## 2025-01-24 ENCOUNTER — HOSPITAL ENCOUNTER (OUTPATIENT)
Facility: HOSPITAL | Age: 30
Discharge: HOME | End: 2025-01-24
Attending: STUDENT IN AN ORGANIZED HEALTH CARE EDUCATION/TRAINING PROGRAM | Admitting: STUDENT IN AN ORGANIZED HEALTH CARE EDUCATION/TRAINING PROGRAM
Payer: COMMERCIAL

## 2025-01-24 VITALS — SYSTOLIC BLOOD PRESSURE: 131 MMHG | RESPIRATION RATE: 16 BRPM | DIASTOLIC BLOOD PRESSURE: 78 MMHG | HEART RATE: 88 BPM

## 2025-01-24 DIAGNOSIS — O14.90 PREECLAMPSIA: ICD-10-CM

## 2025-01-24 NOTE — DISCHARGE INSTRUCTIONS
Keep your scheduled appointment with your provider.    Call your Doctor if you have any of the following:  Temperature above 100 degrees  Nausea, vomiting and/or diarrhea  Severe headache, dizziness, or blurred vision  Notable increase in swelling of hands or feet  Notable swelling of face and lips  Difficulty, pain or burning with urination  Foul smelling vaginal discharge  Decreased fetal movement    Come to the hospital if you have any of the following symptoms:  Your water breaks  More than 4-6 contractions in 1 hour for 2 or more hours  Vaginal bleeding like a period    After 28 weeks, you should feel 10 distinct fetal movements within a 2 hour period.    It is recommended that you drink 1/2 a gallon of water each day.  Tea, Soda and Juice are  in addition to this.    Labor and Delivery Phone number: [phone]    If you need to be seen on Labor and delivery between the hours of 6pm and 7am, please enter through the Emergency room entrance.

## 2025-01-28 ENCOUNTER — HOSPITAL ENCOUNTER (INPATIENT)
Facility: HOSPITAL | Age: 30
LOS: 4 days | Discharge: HOME | End: 2025-02-01
Attending: STUDENT IN AN ORGANIZED HEALTH CARE EDUCATION/TRAINING PROGRAM | Admitting: STUDENT IN AN ORGANIZED HEALTH CARE EDUCATION/TRAINING PROGRAM
Payer: COMMERCIAL

## 2025-01-28 DIAGNOSIS — O14.90 PREECLAMPSIA: ICD-10-CM

## 2025-01-28 DIAGNOSIS — Z34.90 ENCOUNTER FOR ELECTIVE INDUCTION OF LABOR: ICD-10-CM

## 2025-01-28 LAB
ABO + RH BLD: NORMAL
AMPHET+METHAMPHET UR QL: NEGATIVE
BARBITURATES UR QL SCN>200 NG/ML: NEGATIVE
BASOPHILS # BLD AUTO: 0.03 K/UL (ref 0–0.2)
BASOPHILS NFR BLD: 0.3 % (ref 0–1.9)
BENZODIAZ UR QL SCN>200 NG/ML: NEGATIVE
BILIRUB UR QL STRIP: NEGATIVE
BLD GP AB SCN CELLS X3 SERPL QL: NORMAL
BUPRENORPHINE UR QL: NEGATIVE
BZE UR QL SCN: NEGATIVE
CANNABINOIDS UR QL SCN: NEGATIVE
CLARITY UR: CLEAR
COLOR UR: COLORLESS
CREAT UR-MCNC: 31.8 MG/DL (ref 15–325)
DIFFERENTIAL METHOD BLD: ABNORMAL
EOSINOPHIL # BLD AUTO: 0.1 K/UL (ref 0–0.5)
EOSINOPHIL NFR BLD: 1 % (ref 0–8)
ERYTHROCYTE [DISTWIDTH] IN BLOOD BY AUTOMATED COUNT: 13.7 % (ref 11.5–14.5)
FENTANYL UR QL SCN: NORMAL
GLUCOSE UR QL STRIP: NEGATIVE
HCT VFR BLD AUTO: 30.7 % (ref 37–48.5)
HGB BLD-MCNC: 10.6 G/DL (ref 12–16)
HGB UR QL STRIP: NEGATIVE
IMM GRANULOCYTES # BLD AUTO: 0.08 K/UL (ref 0–0.04)
IMM GRANULOCYTES NFR BLD AUTO: 0.7 % (ref 0–0.5)
KETONES UR QL STRIP: NEGATIVE
LEUKOCYTE ESTERASE UR QL STRIP: NEGATIVE
LYMPHOCYTES # BLD AUTO: 2.8 K/UL (ref 1–4.8)
LYMPHOCYTES NFR BLD: 24.7 % (ref 18–48)
MCH RBC QN AUTO: 30.5 PG (ref 27–31)
MCHC RBC AUTO-ENTMCNC: 34.5 G/DL (ref 32–36)
MCV RBC AUTO: 89 FL (ref 82–98)
MONOCYTES # BLD AUTO: 0.9 K/UL (ref 0.3–1)
MONOCYTES NFR BLD: 7.7 % (ref 4–15)
NEUTROPHILS # BLD AUTO: 7.4 K/UL (ref 1.8–7.7)
NEUTROPHILS NFR BLD: 65.6 % (ref 38–73)
NITRITE UR QL STRIP: NEGATIVE
NRBC BLD-RTO: 0 /100 WBC
OPIATES UR QL SCN: NEGATIVE
PCP UR QL SCN>25 NG/ML: NEGATIVE
PH UR STRIP: 8 [PH] (ref 5–8)
PLATELET # BLD AUTO: 201 K/UL (ref 150–450)
PMV BLD AUTO: 9.2 FL (ref 9.2–12.9)
PROT UR QL STRIP: NEGATIVE
RBC # BLD AUTO: 3.47 M/UL (ref 4–5.4)
SP GR UR STRIP: 1 (ref 1–1.03)
TOXICOLOGY INFORMATION: NORMAL
URN SPEC COLLECT METH UR: ABNORMAL
UROBILINOGEN UR STRIP-ACNC: NEGATIVE EU/DL
WBC # BLD AUTO: 11.2 K/UL (ref 3.9–12.7)

## 2025-01-28 PROCEDURE — 12000002 HC ACUTE/MED SURGE SEMI-PRIVATE ROOM

## 2025-01-28 PROCEDURE — 80307 DRUG TEST PRSMV CHEM ANLYZR: CPT | Mod: 91 | Performed by: STUDENT IN AN ORGANIZED HEALTH CARE EDUCATION/TRAINING PROGRAM

## 2025-01-28 PROCEDURE — 80307 DRUG TEST PRSMV CHEM ANLYZR: CPT | Performed by: STUDENT IN AN ORGANIZED HEALTH CARE EDUCATION/TRAINING PROGRAM

## 2025-01-28 PROCEDURE — 63600175 PHARM REV CODE 636 W HCPCS: Performed by: STUDENT IN AN ORGANIZED HEALTH CARE EDUCATION/TRAINING PROGRAM

## 2025-01-28 PROCEDURE — 3E0DXGC INTRODUCTION OF OTHER THERAPEUTIC SUBSTANCE INTO MOUTH AND PHARYNX, EXTERNAL APPROACH: ICD-10-PCS | Performed by: STUDENT IN AN ORGANIZED HEALTH CARE EDUCATION/TRAINING PROGRAM

## 2025-01-28 PROCEDURE — 85025 COMPLETE CBC W/AUTO DIFF WBC: CPT | Performed by: STUDENT IN AN ORGANIZED HEALTH CARE EDUCATION/TRAINING PROGRAM

## 2025-01-28 PROCEDURE — 3E033VJ INTRODUCTION OF OTHER HORMONE INTO PERIPHERAL VEIN, PERCUTANEOUS APPROACH: ICD-10-PCS | Performed by: STUDENT IN AN ORGANIZED HEALTH CARE EDUCATION/TRAINING PROGRAM

## 2025-01-28 PROCEDURE — 86593 SYPHILIS TEST NON-TREP QUANT: CPT | Performed by: STUDENT IN AN ORGANIZED HEALTH CARE EDUCATION/TRAINING PROGRAM

## 2025-01-28 PROCEDURE — 81003 URINALYSIS AUTO W/O SCOPE: CPT | Performed by: STUDENT IN AN ORGANIZED HEALTH CARE EDUCATION/TRAINING PROGRAM

## 2025-01-28 PROCEDURE — 86901 BLOOD TYPING SEROLOGIC RH(D): CPT | Performed by: STUDENT IN AN ORGANIZED HEALTH CARE EDUCATION/TRAINING PROGRAM

## 2025-01-28 PROCEDURE — 25000003 PHARM REV CODE 250: Performed by: STUDENT IN AN ORGANIZED HEALTH CARE EDUCATION/TRAINING PROGRAM

## 2025-01-28 RX ORDER — OXYTOCIN-SODIUM CHLORIDE 0.9% IV SOLN 30 UNIT/500ML 30-0.9/5 UT/ML-%
0-32 SOLUTION INTRAVENOUS CONTINUOUS
Status: DISCONTINUED | OUTPATIENT
Start: 2025-01-28 | End: 2025-01-30

## 2025-01-28 RX ORDER — MISOPROSTOL 200 UG/1
800 TABLET ORAL ONCE AS NEEDED
Status: DISCONTINUED | OUTPATIENT
Start: 2025-01-28 | End: 2025-01-30

## 2025-01-28 RX ORDER — METHYLERGONOVINE MALEATE 0.2 MG/ML
200 INJECTION INTRAVENOUS ONCE AS NEEDED
Status: DISCONTINUED | OUTPATIENT
Start: 2025-01-28 | End: 2025-01-30

## 2025-01-28 RX ORDER — OXYTOCIN-SODIUM CHLORIDE 0.9% IV SOLN 30 UNIT/500ML 30-0.9/5 UT/ML-%
10 SOLUTION INTRAVENOUS ONCE AS NEEDED
Status: DISCONTINUED | OUTPATIENT
Start: 2025-01-28 | End: 2025-01-30

## 2025-01-28 RX ORDER — TRANEXAMIC ACID 10 MG/ML
1000 INJECTION, SOLUTION INTRAVENOUS ONCE
Status: DISCONTINUED | OUTPATIENT
Start: 2025-01-28 | End: 2025-01-30

## 2025-01-28 RX ORDER — ROPIVACAINE HYDROCHLORIDE 2 MG/ML
20 INJECTION, SOLUTION EPIDURAL; INFILTRATION ONCE AS NEEDED
Status: COMPLETED | OUTPATIENT
Start: 2025-01-28 | End: 2025-01-29

## 2025-01-28 RX ORDER — CALCIUM CARBONATE 200(500)MG
500 TABLET,CHEWABLE ORAL 3 TIMES DAILY PRN
Status: DISCONTINUED | OUTPATIENT
Start: 2025-01-28 | End: 2025-01-30

## 2025-01-28 RX ORDER — MISOPROSTOL 100 MCG
25 TABLET ORAL EVERY 4 HOURS PRN
Status: DISCONTINUED | OUTPATIENT
Start: 2025-01-28 | End: 2025-01-29

## 2025-01-28 RX ORDER — OXYTOCIN-SODIUM CHLORIDE 0.9% IV SOLN 30 UNIT/500ML 30-0.9/5 UT/ML-%
95 SOLUTION INTRAVENOUS CONTINUOUS PRN
Status: DISCONTINUED | OUTPATIENT
Start: 2025-01-28 | End: 2025-01-30

## 2025-01-28 RX ORDER — BUTORPHANOL TARTRATE 2 MG/ML
1 INJECTION INTRAMUSCULAR; INTRAVENOUS EVERY 4 HOURS PRN
Status: DISCONTINUED | OUTPATIENT
Start: 2025-01-28 | End: 2025-01-30

## 2025-01-28 RX ORDER — EPHEDRINE SULFATE 50 MG/ML
10 INJECTION, SOLUTION INTRAVENOUS ONCE
Status: DISCONTINUED | OUTPATIENT
Start: 2025-01-28 | End: 2025-01-30

## 2025-01-28 RX ORDER — CARBOPROST TROMETHAMINE 250 UG/ML
250 INJECTION, SOLUTION INTRAMUSCULAR
Status: DISCONTINUED | OUTPATIENT
Start: 2025-01-28 | End: 2025-01-30

## 2025-01-28 RX ORDER — FENTANYL/BUPIVACAINE/NS/PF 2MCG/ML-.1
PLASTIC BAG, INJECTION (ML) INJECTION CONTINUOUS
Status: DISCONTINUED | OUTPATIENT
Start: 2025-01-28 | End: 2025-01-30

## 2025-01-28 RX ORDER — SODIUM CHLORIDE, SODIUM LACTATE, POTASSIUM CHLORIDE, CALCIUM CHLORIDE 600; 310; 30; 20 MG/100ML; MG/100ML; MG/100ML; MG/100ML
INJECTION, SOLUTION INTRAVENOUS CONTINUOUS
Status: DISCONTINUED | OUTPATIENT
Start: 2025-01-28 | End: 2025-01-30

## 2025-01-28 RX ORDER — OXYTOCIN 10 [USP'U]/ML
10 INJECTION, SOLUTION INTRAMUSCULAR; INTRAVENOUS ONCE AS NEEDED
Status: DISCONTINUED | OUTPATIENT
Start: 2025-01-28 | End: 2025-01-30

## 2025-01-28 RX ORDER — OXYTOCIN-SODIUM CHLORIDE 0.9% IV SOLN 30 UNIT/500ML 30-0.9/5 UT/ML-%
95 SOLUTION INTRAVENOUS ONCE AS NEEDED
Status: DISCONTINUED | OUTPATIENT
Start: 2025-01-28 | End: 2025-01-30

## 2025-01-28 RX ORDER — DIPHENOXYLATE HYDROCHLORIDE AND ATROPINE SULFATE 2.5; .025 MG/1; MG/1
2 TABLET ORAL EVERY 6 HOURS PRN
Status: DISCONTINUED | OUTPATIENT
Start: 2025-01-28 | End: 2025-01-30

## 2025-01-28 RX ORDER — ONDANSETRON HYDROCHLORIDE 2 MG/ML
4 INJECTION, SOLUTION INTRAVENOUS EVERY 6 HOURS PRN
Status: DISCONTINUED | OUTPATIENT
Start: 2025-01-28 | End: 2025-01-30

## 2025-01-28 RX ADMIN — MISOPROSTOL 25 MCG: 100 TABLET ORAL at 11:01

## 2025-01-28 RX ADMIN — DEXTROSE MONOHYDRATE 5 MILLION UNITS: 5 INJECTION INTRAVENOUS at 11:01

## 2025-01-28 RX ADMIN — SODIUM CHLORIDE, POTASSIUM CHLORIDE, SODIUM LACTATE AND CALCIUM CHLORIDE: 600; 310; 30; 20 INJECTION, SOLUTION INTRAVENOUS at 11:01

## 2025-01-28 NOTE — NURSING
OBGYN LABS ENTERED INTO RESULTS CONSOLE      1st Trimester Labs Entered Into Results Console     [] AOBRH   [x] Rubella Immune   [x] RPR   [x] HBsAg   [x] HIV   [x] Chlamydia   [x] Gonorrhea   [] Cell-Free DNA   [x] Hep-C   [] Varicella    2nd Trimester Labs Entered Into Results Console     [x]OB Glucose Screen      3rd Trimester Labs Entered Into Results Console      [x] GBS   [] RPR    Drug Screen Entered Into Results Console     [] Benzodiazes   [] Methadone   [] Cocaine (Metab)   [] Opiate   [] Amphetamine   [] Marijuana   [] Creatinine   [] Amphetamines-Beaker   [] Barbituates-Beaker   [] Benzodiazepine-Beaker   [] Cannabinoids-Beaker   [] Cocaine-Beaker   [] Fentanyl-Beaker   [] MDMA-Beaker   [] Opiates-Beaker   [] Phencyclidine-Beaker        Results Entered by Collette Leo, RN    Results Verified for Manual Entry Accuracy by Zuri Shen RN

## 2025-01-29 ENCOUNTER — ANESTHESIA EVENT (OUTPATIENT)
Dept: OBSTETRICS AND GYNECOLOGY | Facility: HOSPITAL | Age: 30
End: 2025-01-29
Payer: COMMERCIAL

## 2025-01-29 ENCOUNTER — ANESTHESIA (OUTPATIENT)
Dept: OBSTETRICS AND GYNECOLOGY | Facility: HOSPITAL | Age: 30
End: 2025-01-29
Payer: COMMERCIAL

## 2025-01-29 LAB — TREPONEMA PALLIDUM IGG+IGM AB [PRESENCE] IN SERUM OR PLASMA BY IMMUNOASSAY: NONREACTIVE

## 2025-01-29 PROCEDURE — 10907ZC DRAINAGE OF AMNIOTIC FLUID, THERAPEUTIC FROM PRODUCTS OF CONCEPTION, VIA NATURAL OR ARTIFICIAL OPENING: ICD-10-PCS | Performed by: STUDENT IN AN ORGANIZED HEALTH CARE EDUCATION/TRAINING PROGRAM

## 2025-01-29 PROCEDURE — 12000002 HC ACUTE/MED SURGE SEMI-PRIVATE ROOM

## 2025-01-29 PROCEDURE — 62326 NJX INTERLAMINAR LMBR/SAC: CPT | Performed by: ANESTHESIOLOGY

## 2025-01-29 PROCEDURE — 63600175 PHARM REV CODE 636 W HCPCS: Performed by: STUDENT IN AN ORGANIZED HEALTH CARE EDUCATION/TRAINING PROGRAM

## 2025-01-29 PROCEDURE — 59409 OBSTETRICAL CARE: CPT | Mod: ,,, | Performed by: ANESTHESIOLOGY

## 2025-01-29 PROCEDURE — 25000003 PHARM REV CODE 250: Performed by: STUDENT IN AN ORGANIZED HEALTH CARE EDUCATION/TRAINING PROGRAM

## 2025-01-29 PROCEDURE — C1751 CATH, INF, PER/CENT/MIDLINE: HCPCS | Performed by: ANESTHESIOLOGY

## 2025-01-29 PROCEDURE — 25000003 PHARM REV CODE 250: Performed by: ANESTHESIOLOGY

## 2025-01-29 PROCEDURE — 27200710 HC EPIDURAL INFUSION PUMP SET: Performed by: ANESTHESIOLOGY

## 2025-01-29 PROCEDURE — 63600175 PHARM REV CODE 636 W HCPCS: Performed by: ANESTHESIOLOGY

## 2025-01-29 RX ORDER — NALOXONE HCL 0.4 MG/ML
0.4 VIAL (ML) INJECTION SEE ADMIN INSTRUCTIONS
Status: DISCONTINUED | OUTPATIENT
Start: 2025-01-29 | End: 2025-01-30

## 2025-01-29 RX ORDER — MISOPROSTOL 100 MCG
25 TABLET ORAL EVERY 4 HOURS PRN
Status: DISCONTINUED | OUTPATIENT
Start: 2025-01-29 | End: 2025-01-30

## 2025-01-29 RX ORDER — ROPIVACAINE HYDROCHLORIDE 2 MG/ML
INJECTION, SOLUTION EPIDURAL; INFILTRATION
Status: DISCONTINUED | OUTPATIENT
Start: 2025-01-29 | End: 2025-01-30

## 2025-01-29 RX ORDER — EPHEDRINE SULFATE 50 MG/ML
10 INJECTION, SOLUTION INTRAVENOUS ONCE AS NEEDED
Status: DISCONTINUED | OUTPATIENT
Start: 2025-01-29 | End: 2025-01-30

## 2025-01-29 RX ORDER — DIPHENHYDRAMINE HYDROCHLORIDE 50 MG/ML
12.5 INJECTION INTRAMUSCULAR; INTRAVENOUS EVERY 4 HOURS PRN
Status: DISCONTINUED | OUTPATIENT
Start: 2025-01-29 | End: 2025-01-30

## 2025-01-29 RX ORDER — ONDANSETRON HYDROCHLORIDE 2 MG/ML
4 INJECTION, SOLUTION INTRAVENOUS EVERY 6 HOURS PRN
Status: DISCONTINUED | OUTPATIENT
Start: 2025-01-29 | End: 2025-01-30

## 2025-01-29 RX ORDER — FENTANYL/BUPIVACAINE/NS/PF 2MCG/ML-.1
14 PLASTIC BAG, INJECTION (ML) INJECTION CONTINUOUS
Status: DISCONTINUED | OUTPATIENT
Start: 2025-01-29 | End: 2025-01-30

## 2025-01-29 RX ADMIN — SODIUM CHLORIDE, POTASSIUM CHLORIDE, SODIUM LACTATE AND CALCIUM CHLORIDE: 600; 310; 30; 20 INJECTION, SOLUTION INTRAVENOUS at 09:01

## 2025-01-29 RX ADMIN — DEXTROSE MONOHYDRATE 3 MILLION UNITS: 50 INJECTION, SOLUTION INTRAVENOUS at 11:01

## 2025-01-29 RX ADMIN — OXYTOCIN 2 MILLI-UNITS/MIN: 10 INJECTION, SOLUTION INTRAMUSCULAR; INTRAVENOUS at 08:01

## 2025-01-29 RX ADMIN — DEXTROSE MONOHYDRATE 3 MILLION UNITS: 50 INJECTION, SOLUTION INTRAVENOUS at 03:01

## 2025-01-29 RX ADMIN — MISOPROSTOL 25 MCG: 100 TABLET ORAL at 03:01

## 2025-01-29 RX ADMIN — ROPIVACAINE HYDROCHLORIDE 20 ML: 2 INJECTION EPIDURAL; INFILTRATION; PERINEURAL at 09:01

## 2025-01-29 RX ADMIN — ROPIVACAINE HYDROCHLORIDE 4 ML: 2 INJECTION, SOLUTION EPIDURAL; INFILTRATION at 09:01

## 2025-01-29 RX ADMIN — Medication 14 ML/HR: at 09:01

## 2025-01-29 RX ADMIN — DEXTROSE MONOHYDRATE 3 MILLION UNITS: 50 INJECTION, SOLUTION INTRAVENOUS at 07:01

## 2025-01-29 RX ADMIN — SODIUM CHLORIDE, POTASSIUM CHLORIDE, SODIUM LACTATE AND CALCIUM CHLORIDE: 600; 310; 30; 20 INJECTION, SOLUTION INTRAVENOUS at 06:01

## 2025-01-29 RX ADMIN — DEXTROSE MONOHYDRATE 3 MILLION UNITS: 50 INJECTION, SOLUTION INTRAVENOUS at 06:01

## 2025-01-29 NOTE — NURSING
[facility]  Department of Obstetrics and Gynecology  PATIENT NAME: Kelsey Rick  MRN: [id number]  TODAY'S DATE: 2025    CHIEF COMPLAINT: Scheduled Induction      OB History    Para Term  AB Living   3 1 1 0 0 1   SAB IAB Ectopic Multiple Live Births   0 0 0 0 1      # Outcome Date GA Lbr Kristofer/2nd Weight Sex Type Anes PTL Lv   3 Current            2 Term 22 38w0d 16:50 / 00:15 3.275 kg (7 lb 3.5 oz) F Vag-Spont EPI N DANIEL      Name: GENOVEVA SINGH,GIRL KELSEY      Apgar1: 8  Apgar5: 9   1               No past medical history on file.  Past Surgical History:   Procedure Laterality Date    TONSILLECTOMY      WISDOM TOOTH EXTRACTION       Social History     Tobacco Use    Smoking status: Never    Smokeless tobacco: Never   Substance Use Topics    Alcohol use: Not Currently    Drug use: Never       Prenatal Labs  Lab Results   Component Value Date    GROUPTRH O POS 2024    HGB 14.9 2024    HCT 44.4 2024     2024    RUBELLAIMMUN immune 2024    HEPBSAG Negative 2024    ULS87DJOM non reactive 2024    RPR non reactive 2024    OBGLUCOSESCR 115 2024    STREPBCULT positive 2025       VITAL SIGNS - ABNORMAL VITALS INCLUDE TEMP >100.4,RR <12 or >26, SUSTAINED MATERNAL PULSE <60 or >120     VITAL SIGNS (Most Recent)       OUTPATIENT MEDICATIONS  Current Outpatient Medications   Medication Instructions    multivitamin (ONE DAILY MULTIVITAMIN) per tablet 1 tablet, Oral, Daily       Carmen Hair RN  [facility]  2025

## 2025-01-30 PROBLEM — O13.3 GESTATIONAL HYPERTENSION, THIRD TRIMESTER: Status: ACTIVE | Noted: 2025-01-30

## 2025-01-30 PROBLEM — O14.93 PREECLAMPSIA, THIRD TRIMESTER: Status: ACTIVE | Noted: 2025-01-30

## 2025-01-30 PROCEDURE — 25000003 PHARM REV CODE 250: Performed by: STUDENT IN AN ORGANIZED HEALTH CARE EDUCATION/TRAINING PROGRAM

## 2025-01-30 PROCEDURE — 12000002 HC ACUTE/MED SURGE SEMI-PRIVATE ROOM

## 2025-01-30 PROCEDURE — 72200005 HC VAGINAL DELIVERY LEVEL II

## 2025-01-30 PROCEDURE — 63600175 PHARM REV CODE 636 W HCPCS: Performed by: STUDENT IN AN ORGANIZED HEALTH CARE EDUCATION/TRAINING PROGRAM

## 2025-01-30 PROCEDURE — 63600175 PHARM REV CODE 636 W HCPCS: Performed by: ANESTHESIOLOGY

## 2025-01-30 RX ORDER — IBUPROFEN 400 MG/1
800 TABLET ORAL EVERY 6 HOURS PRN
Status: DISCONTINUED | OUTPATIENT
Start: 2025-01-30 | End: 2025-02-01 | Stop reason: HOSPADM

## 2025-01-30 RX ORDER — DIPHENHYDRAMINE HCL 25 MG
25 CAPSULE ORAL EVERY 4 HOURS PRN
Status: DISCONTINUED | OUTPATIENT
Start: 2025-01-30 | End: 2025-02-01 | Stop reason: HOSPADM

## 2025-01-30 RX ORDER — OXYCODONE AND ACETAMINOPHEN 5; 325 MG/1; MG/1
1 TABLET ORAL EVERY 4 HOURS PRN
Status: DISCONTINUED | OUTPATIENT
Start: 2025-01-30 | End: 2025-02-01 | Stop reason: HOSPADM

## 2025-01-30 RX ORDER — OXYCODONE AND ACETAMINOPHEN 10; 325 MG/1; MG/1
1 TABLET ORAL EVERY 4 HOURS PRN
Status: DISCONTINUED | OUTPATIENT
Start: 2025-01-30 | End: 2025-02-01 | Stop reason: HOSPADM

## 2025-01-30 RX ORDER — SIMETHICONE 80 MG
1 TABLET,CHEWABLE ORAL EVERY 6 HOURS PRN
Status: DISCONTINUED | OUTPATIENT
Start: 2025-01-30 | End: 2025-02-01 | Stop reason: HOSPADM

## 2025-01-30 RX ORDER — ONDANSETRON 4 MG/1
8 TABLET, ORALLY DISINTEGRATING ORAL EVERY 8 HOURS PRN
Status: DISCONTINUED | OUTPATIENT
Start: 2025-01-30 | End: 2025-02-01 | Stop reason: HOSPADM

## 2025-01-30 RX ORDER — MISOPROSTOL 200 UG/1
800 TABLET ORAL ONCE
Status: COMPLETED | OUTPATIENT
Start: 2025-01-30 | End: 2025-01-30

## 2025-01-30 RX ORDER — DOCUSATE SODIUM 100 MG/1
200 CAPSULE, LIQUID FILLED ORAL 2 TIMES DAILY PRN
Status: DISCONTINUED | OUTPATIENT
Start: 2025-01-30 | End: 2025-02-01 | Stop reason: HOSPADM

## 2025-01-30 RX ORDER — PRENATAL WITH FERROUS FUM AND FOLIC ACID 3080; 920; 120; 400; 22; 1.84; 3; 20; 10; 1; 12; 200; 27; 25; 2 [IU]/1; [IU]/1; MG/1; [IU]/1; MG/1; MG/1; MG/1; MG/1; MG/1; MG/1; UG/1; MG/1; MG/1; MG/1; MG/1
1 TABLET ORAL DAILY
Status: DISCONTINUED | OUTPATIENT
Start: 2025-01-30 | End: 2025-02-01 | Stop reason: HOSPADM

## 2025-01-30 RX ORDER — HYDROCORTISONE 25 MG/G
CREAM TOPICAL 3 TIMES DAILY PRN
Status: DISCONTINUED | OUTPATIENT
Start: 2025-01-30 | End: 2025-02-01 | Stop reason: HOSPADM

## 2025-01-30 RX ORDER — OXYTOCIN-SODIUM CHLORIDE 0.9% IV SOLN 30 UNIT/500ML 30-0.9/5 UT/ML-%
95 SOLUTION INTRAVENOUS CONTINUOUS PRN
Status: DISCONTINUED | OUTPATIENT
Start: 2025-01-30 | End: 2025-02-01 | Stop reason: HOSPADM

## 2025-01-30 RX ADMIN — MISOPROSTOL 800 MCG: 200 TABLET ORAL at 01:01

## 2025-01-30 RX ADMIN — IBUPROFEN 800 MG: 400 TABLET, FILM COATED ORAL at 11:01

## 2025-01-30 RX ADMIN — OXYTOCIN 95 MILLI-UNITS/MIN: 10 INJECTION, SOLUTION INTRAMUSCULAR; INTRAVENOUS at 11:01

## 2025-01-30 RX ADMIN — DIPHENHYDRAMINE HYDROCHLORIDE 12.5 MG: 50 INJECTION INTRAMUSCULAR; INTRAVENOUS at 06:01

## 2025-01-30 RX ADMIN — ONDANSETRON 4 MG: 2 INJECTION INTRAMUSCULAR; INTRAVENOUS at 08:01

## 2025-01-30 RX ADMIN — DEXTROSE MONOHYDRATE 3 MILLION UNITS: 50 INJECTION, SOLUTION INTRAVENOUS at 03:01

## 2025-01-30 RX ADMIN — DEXTROSE MONOHYDRATE 3 MILLION UNITS: 50 INJECTION, SOLUTION INTRAVENOUS at 07:01

## 2025-01-30 NOTE — ANESTHESIA PROCEDURE NOTES
Epidural    Patient location during procedure: OB   Reason for block: primary anesthetic   Reason for block: labor analgesia requested by patient and obstetrician  Diagnosis: IUP    Start time: 1/29/2025 9:00 PM  Timeout: 1/29/2025 9:00 PM  End time: 1/29/2025 9:10 PM    Staffing  Performing Provider: Leandro Dozier MD  Authorizing Provider: Leandro Dozier MD    Staffing  Performed by: Leandro Dozier MD  Authorized by: Leandro Dozier MD        Preanesthetic Checklist  Completed: patient identified, IV checked, site marked, risks and benefits discussed, surgical consent, monitors and equipment checked, pre-op evaluation, timeout performed, anesthesia consent given, hand hygiene performed and patient being monitored  Preparation  Patient position: sitting  Prep: Betadine  Patient monitoring: ECG, Pulse Ox and Blood Pressure  Reason for block: primary anesthetic   Epidural  Skin Anesthetic: lidocaine 1%  Administration type: continuous  Approach: midline  Interspace: L4-5    Injection technique: DAGO air  Needle and Epidural Catheter  Needle type: Tuohy   Needle gauge: 17  Needle length: 3.5 inches  Catheter type: springwKuotus  Catheter size: 19 G  Insertion Attempts: 1  Test dose: 3 mL of lidocaine 1.5% with Epi 1-to-200,000  Additional Documentation: incremental injection, negative aspiration for heme and CSF, no paresthesia on injection, no signs/symptoms of IV or SA injection, no significant pain on injection and no significant complaints from patient  Needle localization: anatomical landmarks  Assessment  Ease of block: easy  Patient's tolerance of the procedure: comfortable throughout block and no complaints  Additional Notes    LE  PCEA No inadvertent dural puncture with Tuohy.  Dural puncture not performed with spinal needle

## 2025-01-30 NOTE — PLAN OF CARE
[facility]  Discharge Assessment    Primary Care Provider: No, Primary Doctor     OB Screen completed using health record.  No needs anticipated at this time, and no consult(s) noted.    OB Screen (most recent)       OB Screen - 25 1600          OB SCREEN    Assessment Type Discharge Planning Assessment     Source of Information health record     Received Prenatal Care Yes     Any indications/suspicions for None     Is this a teen pregnancy No     Is the baby in NICU No     Indication for adoption/[facility] Haven No     Indication for DME/post-acute needs No     HIV (+) No     Any congenital  disorders No     Fetal demise/ death No

## 2025-01-30 NOTE — NURSING TRANSFER
Nursing Transfer Note      1/30/2025   2:45 PM    Nurse giving handoff:ROSALIO Lozano RN  Nurse receiving handoff:Carina CASTILLO    Reason patient is being transferred: continued care on post partum    Transfer : LR 4 to 2108    Transfer via W/C    Transfer with saline lock in place    Transported by ROSALIO Lozano RN    Transfer Vital Signs:  Blood Pressure:147/90  Heart Rate:85  O2:  Temperature:  Respirations:18    Telemetry: na  Order for Tele Monitor? No    Additional Lines: none    Medicines sent: none    Any special needs or follow-up needed: first void    Patient belongings transferred with patient: Yes    Chart send with patient: Yes    Notified: spouse    Patient reassessed at: upon arrival (date, time)  1  Upon arrival to floor: patient oriented to room, call bell in reach, and bed in lowest position

## 2025-01-30 NOTE — PLAN OF CARE
Problem: Adult Inpatient Plan of Care  Goal: Plan of Care Review  Outcome: Progressing  Flowsheets (Taken 1/30/2025 1693)  Plan of Care Reviewed With:   patient   spouse  Goal: Patient-Specific Goal (Individualized)  Outcome: Progressing  Goal: Optimal Comfort and Wellbeing  Outcome: Progressing     Problem: Labor  Goal: Stable Fetal Wellbeing  Outcome: Progressing  Goal: Absence of Infection Signs and Symptoms  Outcome: Progressing  Goal: Normal Uterine Contraction Pattern  Outcome: Progressing

## 2025-01-30 NOTE — L&D DELIVERY NOTE
[facility]  Vaginal Delivery   Operative Note    SUMMARY   Patient labored to completion and began to push for spontaneous vaginal delivery.  Fetal head delivered over intact perineum in the OA position.  No nuchal cord.  With gentle downward traction the anterior shoulder delivered without difficulty followed by the posterior shoulder and remaining body.  Infant was placed on maternal abdomen for skin to skin and bulb suctioned.  Delayed cord clamping was performed and cord blood collected.  Spontaneous delivery of placenta and IV Pitocin initiated with good uterine tone. Small superficial first-degree laceration noted, not requiring repair.  Patient tolerated delivery well.  Sponge, needle, and lap count correct. Placenta inspected and noted to be intact.     See delivery summary for:  QBL pending  Apgars pending    Indications: IOL PreE  Pregnancy complicated by:   Patient Active Problem List   Diagnosis     (spontaneous vaginal delivery)    Preeclampsia, third trimester     Admitting GA: 37w1d    Delivery Information for Gus Rick    Birth information:  YOB: 2027   Time of birth: 11:05 AM   Sex: male   Head Delivery Date/Time:     Delivery type:    Gestational Age: 37w1d       Delivery Providers    Delivering clinician:            Measurements    Weight:   Length:          Apgars    Living status:   Apgar Component Scores:  1 min.:  5 min.:  10 min.:  15 min.:  20 min.:    Skin color:         Heart rate:         Reflex irritability:         Muscle tone:         Respiratory effort:         Total:                                  Interventions/Resuscitation           Cord    No data filed       Placenta    Placenta delivery date/time:   Placenta removal:            Labor Events:       labor:       Labor Onset Date/Time:         Dilation Complete Date/Time: 2025 09:10     Start Pushing Date/Time: 2025 10:48       Start Pushing Date/Time: 2025 10:48      Rupture Date/Time: 25        Rupture type: ARM (Artificial Rupture)        Fluid Amount:       Fluid Color: Clear              steroids:       Antibiotics given for GBS:       Induction:       Indications for induction:        Augmentation:       Indications for augmentation:       Labor complications:       Additional complications:          Cervical ripening:                     Delivery:      Episiotomy:       Indication for Episiotomy:       Perineal Lacerations:   Repaired:      Periurethral Laceration:   Repaired:     Labial Laceration:   Repaired:     Sulcus Laceration:   Repaired:     Vaginal Laceration:   Repaired:     Cervical Laceration:   Repaired:     Repair suture:       Repair # of packets:       Last Value - EBL - Nursing (mL):       Sum - EBL - Nursing (mL): 0     Last Value - EBL - Anesthesia (mL):      Calculated QBL (mL):       Running total QBL (mL):       Vaginal Sweep Performed:       Surgicount Correct:         Other providers:            Details (if applicable):  Trial of Labor      Categorization:      Priority:     Indications for :     Incision Type:       Additional  information:  Forceps:    Vacuum:    Breech:    Observed anomalies    Other (Comments):       Mayra Arcos MD  Obstetrics and Gynecology  [facility]\

## 2025-01-30 NOTE — LACTATION NOTE
This note was copied from a baby's chart.     01/30/25 6728   Maternal Assessment   Breast Size Issue none   Breast Shape round   Breast Density soft   Areola elastic   Nipples everted   Maternal Infant Feeding   Maternal Emotional State independent;relaxed   Infant Positioning cross-cradle   Signs of Milk Transfer infant jaw motion present   Pain with Feeding no   Latch Assistance no     Mom breastfeeding baby now. Latch appears to be deep, mom denies any pain at this time. Baby has uncoordinated suck & swallow. Instructed on the signs of an effective feeding.  Discussed positioning, comfortable latch, rhythmic, nutritive sucking, audible swallows, appropriate length of feeding, comfort of latch and evaluating for fullness cues.  Also discussed appropriate output for age. Assistance offered prn.  Mom states understanding and verbalized appropriate recall.

## 2025-01-30 NOTE — ANESTHESIA PREPROCEDURE EVALUATION
01/29/2025  Kelsey Rick is a 29 y.o., female.      Patient Active Problem List   Diagnosis   (none) - all problems resolved or deleted       Past Surgical History:   Procedure Laterality Date    TONSILLECTOMY      WISDOM TOOTH EXTRACTION          Tobacco Use:  The patient  reports that she has never smoked. She has never used smokeless tobacco.     No results found for this or any previous visit.          Lab Results   Component Value Date    WBC 11.20 01/28/2025    HGB 10.6 (L) 01/28/2025    HCT 30.7 (L) 01/28/2025    MCV 89 01/28/2025     01/28/2025           No results found for this or any previous visit.              Pre-op Assessment    I have reviewed the Patient Summary Reports.     I have reviewed the Nursing Notes. I have reviewed the NPO Status.   I have reviewed the Medications.     Review of Systems  Anesthesia Hx:  No problems with previous Anesthesia             Denies Family Hx of Anesthesia complications.    Denies Personal Hx of Anesthesia complications.                    Social:  Non-Smoker       Hematology/Oncology:  Hematology Normal                                     Cardiovascular:  Cardiovascular Normal                                              Pulmonary:  Pulmonary Normal                       Hepatic/GI:  Hepatic/GI Normal                    Musculoskeletal:  Musculoskeletal Normal                Neurological:  Neurology Normal                                      Endocrine:  Endocrine Normal                Physical Exam  General: Well nourished, Cooperative, Alert and Oriented    Airway:  Mallampati: III / II  Mouth Opening: Normal  TM Distance: Normal  Tongue: Normal  Neck ROM: Normal ROM    Dental:  Intact    Chest/Lungs:  Clear to auscultation    Heart:  Rate: Normal  Rhythm: Regular Rhythm  Sounds: Normal    Abdomen:  Normal, Soft, Nontender        Anesthesia  Plan  Type of Anesthesia, risks & benefits discussed:    Anesthesia Type: Epidural  Intra-op Monitoring Plan: Standard ASA Monitors  Post Op Pain Control Plan: epidural analgesia  Informed Consent: Informed consent signed with the Patient and all parties understand the risks and agree with anesthesia plan.  All questions answered.   ASA Score: 2 Emergent  Anesthesia Plan Notes:   LE  PCEA    Ready For Surgery From Anesthesia Perspective.     .

## 2025-01-30 NOTE — PROGRESS NOTES
[facility]  Obstetrics  Labor Progress Note    Patient Name: Kelsey Rick  MRN: [id number]  Admission Date: 2025  Hospital Length of Stay: 1 days  Attending Physician: Mayra Arcos,*  Primary Care Provider: Nayely, Primary Doctor    Subjective:     Principal Problem:IOL for preE    Interval History:  Kelsey is a 29 y.o.  at 37w0d. She is doing well.     Objective:     Vital Signs (Most Recent):  Temp: 98.2 °F (36.8 °C) (25 1609)  Pulse: 87 (25 1738)  Resp: 16 (25 1609)  BP: 130/77 (25 1609)  SpO2: 98 % (25 0352) Vital Signs (24h Range):  Temp:  [98.2 °F (36.8 °C)-98.8 °F (37.1 °C)] 98.2 °F (36.8 °C)  Pulse:  [0-113] 87  Resp:  [16] 16  SpO2:  [96 %-98 %] 98 %  BP: (106-143)/(55-93) 130/77     Weight: 91.6 kg (202 lb)  Body mass index is 34.67 kg/m².    FHT: 150 bpm, mod variability, + accels, no decels  TOCO:  Q 1-3 minutes    Physical Exam    Cervical Exam:  Dilation:  3  Effacement:  75%  Station: -2  Presentation: Vertex   Copious clear fluid    Significant Labs:  Lab Results   Component Value Date    GROUPTRH O POS 2025    HEPBSAG Negative 2024    STREPBCULT positive 2025       I have personallly reviewed all pertinent lab results from the last 24 hours.  Recent Lab Results         25  0421        Albumin 2.7       ALP 83       ALT 8       Anion Gap 7       AST 17       Baso # 0.05       Basophil % 0.4       BILIRUBIN TOTAL 0.2  Comment: For infants and newborns, interpretation of results should be based  on gestational age, weight and in agreement with clinical  observations.    Premature Infant recommended reference ranges:  Up to 24 hours.............<8.0 mg/dL  Up to 48 hours............<12.0 mg/dL  3-5 days..................<15.0 mg/dL  6-29 days.................<15.0 mg/dL         BUN 3       Calcium 8.2       Chloride 108       CO2 23       Creatinine 0.5       Differential Method Automated       eGFR >60.0       Eos #  0.2       Eos % 1.7       Glucose 82       Gran # (ANC) 8.7       Gran % 65.0       Hematocrit 29.5       Hemoglobin 10.2       Immature Grans (Abs) 0.10  Comment: Mild elevation in immature granulocytes is non specific and   can be seen in a variety of conditions including stress response,   acute inflammation, trauma and pregnancy. Correlation with other   laboratory and clinical findings is essential.         Immature Granulocytes 0.8       Lymph # 3.5       Lymph % 26.2       MCH 31.0       MCHC 34.6       MCV 90       Mono # 0.8       Mono % 5.9       MPV 9.1       nRBC 0       Platelet Count 163       Potassium 3.2       PROTEIN TOTAL 4.7       RBC 3.29       RDW 13.9       Sodium 138       WBC 13.31               Assessment/Plan:     29 y.o. female  at 37w0d for:    -IOL: s/p cytotec. Continue pitocin, s/p AROM  -PreE: BPs at goal. No severe features  -GBS positive: contine PCN ppx  -Epidural on request    Mayra Arcos MD, MD  Obstetrics  [facility]

## 2025-01-31 LAB
ALBUMIN SERPL BCP-MCNC: 2.7 G/DL (ref 3.5–5.2)
ALP SERPL-CCNC: 83 U/L (ref 55–135)
ALT SERPL W/O P-5'-P-CCNC: 8 U/L (ref 10–44)
ANION GAP SERPL CALC-SCNC: 7 MMOL/L (ref 8–16)
AST SERPL-CCNC: 17 U/L (ref 10–40)
BASOPHILS # BLD AUTO: 0.05 K/UL (ref 0–0.2)
BASOPHILS NFR BLD: 0.4 % (ref 0–1.9)
BILIRUB SERPL-MCNC: 0.2 MG/DL (ref 0.1–1)
BUN SERPL-MCNC: 3 MG/DL (ref 6–20)
CALCIUM SERPL-MCNC: 8.2 MG/DL (ref 8.7–10.5)
CHLORIDE SERPL-SCNC: 108 MMOL/L (ref 95–110)
CO2 SERPL-SCNC: 23 MMOL/L (ref 23–29)
CREAT SERPL-MCNC: 0.5 MG/DL (ref 0.5–1.4)
DIFFERENTIAL METHOD BLD: ABNORMAL
EOSINOPHIL # BLD AUTO: 0.2 K/UL (ref 0–0.5)
EOSINOPHIL NFR BLD: 1.7 % (ref 0–8)
ERYTHROCYTE [DISTWIDTH] IN BLOOD BY AUTOMATED COUNT: 13.9 % (ref 11.5–14.5)
EST. GFR  (NO RACE VARIABLE): >60 ML/MIN/1.73 M^2
GLUCOSE SERPL-MCNC: 82 MG/DL (ref 70–110)
HCT VFR BLD AUTO: 29.5 % (ref 37–48.5)
HGB BLD-MCNC: 10.2 G/DL (ref 12–16)
IMM GRANULOCYTES # BLD AUTO: 0.1 K/UL (ref 0–0.04)
IMM GRANULOCYTES NFR BLD AUTO: 0.8 % (ref 0–0.5)
LYMPHOCYTES # BLD AUTO: 3.5 K/UL (ref 1–4.8)
LYMPHOCYTES NFR BLD: 26.2 % (ref 18–48)
MCH RBC QN AUTO: 31 PG (ref 27–31)
MCHC RBC AUTO-ENTMCNC: 34.6 G/DL (ref 32–36)
MCV RBC AUTO: 90 FL (ref 82–98)
MONOCYTES # BLD AUTO: 0.8 K/UL (ref 0.3–1)
MONOCYTES NFR BLD: 5.9 % (ref 4–15)
NEUTROPHILS # BLD AUTO: 8.7 K/UL (ref 1.8–7.7)
NEUTROPHILS NFR BLD: 65 % (ref 38–73)
NRBC BLD-RTO: 0 /100 WBC
PLATELET # BLD AUTO: 163 K/UL (ref 150–450)
PMV BLD AUTO: 9.1 FL (ref 9.2–12.9)
POTASSIUM SERPL-SCNC: 3.2 MMOL/L (ref 3.5–5.1)
PROT SERPL-MCNC: 4.7 G/DL (ref 6–8.4)
RBC # BLD AUTO: 3.29 M/UL (ref 4–5.4)
SODIUM SERPL-SCNC: 138 MMOL/L (ref 136–145)
WBC # BLD AUTO: 13.31 K/UL (ref 3.9–12.7)

## 2025-01-31 PROCEDURE — 36415 COLL VENOUS BLD VENIPUNCTURE: CPT | Performed by: STUDENT IN AN ORGANIZED HEALTH CARE EDUCATION/TRAINING PROGRAM

## 2025-01-31 PROCEDURE — 12000002 HC ACUTE/MED SURGE SEMI-PRIVATE ROOM

## 2025-01-31 PROCEDURE — 80053 COMPREHEN METABOLIC PANEL: CPT | Performed by: STUDENT IN AN ORGANIZED HEALTH CARE EDUCATION/TRAINING PROGRAM

## 2025-01-31 PROCEDURE — 85025 COMPLETE CBC W/AUTO DIFF WBC: CPT | Performed by: STUDENT IN AN ORGANIZED HEALTH CARE EDUCATION/TRAINING PROGRAM

## 2025-01-31 PROCEDURE — 25000003 PHARM REV CODE 250: Performed by: STUDENT IN AN ORGANIZED HEALTH CARE EDUCATION/TRAINING PROGRAM

## 2025-01-31 RX ORDER — IBUPROFEN 800 MG/1
800 TABLET ORAL EVERY 6 HOURS PRN
Qty: 60 TABLET | Refills: 0 | Status: SHIPPED | OUTPATIENT
Start: 2025-01-31

## 2025-01-31 RX ORDER — POTASSIUM CHLORIDE 20 MEQ/1
20 TABLET, EXTENDED RELEASE ORAL ONCE
Status: COMPLETED | OUTPATIENT
Start: 2025-01-31 | End: 2025-01-31

## 2025-01-31 RX ADMIN — IBUPROFEN 800 MG: 400 TABLET, FILM COATED ORAL at 09:01

## 2025-01-31 RX ADMIN — PRENATAL VIT W/ FE FUMARATE-FA TAB 27-0.8 MG 1 TABLET: 27-0.8 TAB at 09:01

## 2025-01-31 RX ADMIN — POTASSIUM CHLORIDE 20 MEQ: 1500 TABLET, EXTENDED RELEASE ORAL at 02:01

## 2025-01-31 NOTE — PROGRESS NOTES
Vaginal Delivery Postpartum Day 1     Kelsey Rick is doing well without complaints. Pain well controlled. Tolerating regular diet. Ambulating and voiding without difficulty. She denies any problems with pp blues. States bleeding is not heavy.     OBJECTIVE:     Vitals:    01/30/25 2325 01/31/25 0322 01/31/25 0751 01/31/25 1151   BP: 135/84 135/87 (!) 143/89 135/81   BP Location:   Right arm Right arm   Patient Position:   Sitting Sitting   Pulse: 74 69 78 80   Resp: 17 18 18 18   Temp: 97.9 °F (36.6 °C) 97.6 °F (36.4 °C) 97.4 °F (36.3 °C) 97.3 °F (36.3 °C)   TempSrc: Oral Oral Oral Oral   SpO2: 97% 96% 99% 97%   Weight:       Height:            I & O (Last 24H):  Intake/Output Summary (Last 24 hours)    Intake/Output Summary (Last 24 hours) at 1/31/2025 1351  Last data filed at 1/30/2025 2324  Gross per 24 hour   Intake --   Output 2600 ml   Net -2600 ml         Physical Exam:  General: NAD, AAO   CV: Regular rate   Resp:   Nonlabored respirations   Abdomen: Soft, appropriately tender    Fundus: Firm   Lochia:  Appropriate   DVT Evaluation: No evidence of DVT on either side seen on physical exam.  No calf tenderness     Labs:  CBC:   Lab Results   Component Value Date/Time    WBC 13.31 (H) 01/31/2025 04:21 AM    RBC 3.29 (L) 01/31/2025 04:21 AM    HGB 10.2 (L) 01/31/2025 04:21 AM    HCT 29.5 (L) 01/31/2025 04:21 AM     01/31/2025 04:21 AM    MCV 90 01/31/2025 04:21 AM    MCH 31.0 01/31/2025 04:21 AM    MCHC 34.6 01/31/2025 04:21 AM      Latest Reference Range & Units 01/31/25 04:21   Sodium 136 - 145 mmol/L 138   Potassium 3.5 - 5.1 mmol/L 3.2 (L)   Chloride 95 - 110 mmol/L 108   CO2 23 - 29 mmol/L 23   Anion Gap 8 - 16 mmol/L 7 (L)   BUN 6 - 20 mg/dL 3 (L)   Creatinine 0.5 - 1.4 mg/dL 0.5   eGFR >60 mL/min/1.73 m^2 >60.0   Glucose 70 - 110 mg/dL 82   Calcium 8.7 - 10.5 mg/dL 8.2 (L)   ALP 55 - 135 U/L 83   PROTEIN TOTAL 6.0 - 8.4 g/dL 4.7 (L)   Albumin 3.5 - 5.2 g/dL 2.7 (L)   BILIRUBIN TOTAL 0.1 -  1.0 mg/dL 0.2   AST 10 - 40 U/L 17   ALT 10 - 44 U/L 8 (L)   (L): Data is abnormally low  ABO/Rh  O POS        ASSESSMENT/PLAN:     S/p vaginal delivery, PPD# 1. Doing well.   Patient Active Problem List   Diagnosis     (spontaneous vaginal delivery)    Preeclampsia, third trimester        Routine postpartal care   Baby doing well.  Patient desires circumcision for infant  Dispo: anticipate discharge today per patient request as she is meeting all criteria    Mayra Arcos MD  Obstetrics and Gynecology  [facility]

## 2025-01-31 NOTE — PLAN OF CARE
01/31/25 1459   Final Note   Assessment Type Final Discharge Note   Anticipated Discharge Disposition Home   What phone number can be called within the next 1-3 days to see how you are doing after discharge? [phone]   Post-Acute Status   Discharge Delays None known at this time     Discharge orders and chart reviewed with no further post-acute discharge needs identified at this time.  At this time, patient is cleared for discharge from Case Management standpoint.

## 2025-01-31 NOTE — ANESTHESIA POSTPROCEDURE EVALUATION
Anesthesia Post Evaluation    Patient: Kelsey Rick    Procedure(s) Performed: * No procedures listed *    Final Anesthesia Type: epidural      Patient location during evaluation: floor  Patient participation: Yes- Able to Participate  Level of consciousness: awake and alert and oriented  Post-procedure vital signs: reviewed and stable  Pain management: adequate  Airway patency: patent    PONV status at discharge: No PONV  Anesthetic complications: no      Cardiovascular status: blood pressure returned to baseline and hemodynamically stable  Respiratory status: unassisted, spontaneous ventilation and room air  Hydration status: euvolemic  Follow-up not needed.          Postpartum day #1 status post vaginal delivery with epidural analgesia. This morning patient is resting comfortably in bed, she is alert and oriented and without complaints. Patient denies headache, back pain, leg pain weakness or numbness. Epidural site examined and no bleeding bruising or discharge noted. No apparent anesthetic related complications. Please reconsult if needed.      Vitals Value Taken Time   /89 01/31/25 0751   Temp 36.3 °C (97.4 °F) 01/31/25 0751   Pulse 78 01/31/25 0751   Resp 18 01/31/25 0751   SpO2 99 % 01/31/25 0751         No case tracking events are documented in the log.      Pain/Gabbie Score: Pain Rating Prior to Med Admin: 2 (1/31/2025  9:15 AM)  Pain Rating Post Med Admin: 0 (1/31/2025 12:24 AM)

## 2025-01-31 NOTE — PHYSICIAN QUERY
Please clarify the diagnosis associated with the documentation of pre-eclampsia:   Pre-eclampsia without severe features (mild/moderate pre-eclampsia)

## 2025-01-31 NOTE — LACTATION NOTE
01/31/25 1600   Maternal Assessment   Breast Density Bilateral:;soft   Areola Bilateral:;elastic   Nipples Bilateral:;everted   Maternal Infant Feeding   Maternal Emotional State assist needed   Infant Positioning clutch/football   Signs of Milk Transfer audible swallow;infant jaw motion present   Pain with Feeding yes   Pain Location nipples, bilateral   Comfort Measures Before/During Feeding infant position adjusted;latch adjusted;maternal position adjusted   Latch Assistance yes     Assisted to latch baby to right breast in cross cradle position. Nipples are red  and tender. Initial latch shallow and mother complains of nipple pain during feeding. Latch broken. Baby latched deeply after several attempts, nursing well with audible swallows. Mother states nipple pain decreased with deeper latch. Reviewed basic breastfeeding instructions and emphasized importance of deep latch with every feeding to avoid further nipple damage. Hydrogel pads provided to promote healing. Encouraged to call me for any further breastfeeding assistance. Patient verbalizes understanding of all instructions with good recall.    Instructed on proper latch to facilitate effective breastfeeding.  Discussed recognizing hunger cues, appropriate positioning and wide mouth latch.  Discussed ways to determine an effective latch including:  areola included in latch, rhythmic/nutritive sucking and audible swallowing.  Also discussed soreness/tenderness associated with latch and prevention and treatment.  Pt states understanding and verbalized appropriate recall.

## 2025-01-31 NOTE — PLAN OF CARE
Problem: Adult Inpatient Plan of Care  Goal: Plan of Care Review  Outcome: Progressing  Goal: Patient-Specific Goal (Individualized)  Outcome: Progressing  Goal: Absence of Hospital-Acquired Illness or Injury  Outcome: Progressing  Goal: Optimal Comfort and Wellbeing  Outcome: Progressing  Goal: Readiness for Transition of Care  Outcome: Progressing     Problem:  Fall Injury Risk  Goal: Absence of Fall, Infant Drop and Related Injury  Outcome: Progressing     Problem: Labor  Goal: Acceptable Pain Control  Outcome: Progressing     Problem: Labor  Goal: Absence of Infection Signs and Symptoms  Outcome: Progressing

## 2025-01-31 NOTE — HOSPITAL COURSE
29-year-old  011 at 37 weeks and 2 days admitted for induction of labor for preeclampsia, no severe features.  Underwent uncomplicated vaginal delivery.  Received GBS prophylaxis during her labor course.  Mother and infant recovering well.  Patient's blood pressures are at goal.  Labs stable.

## 2025-01-31 NOTE — DISCHARGE SUMMARY
On license of [facility]  Obstetrics  Discharge Summary      Patient Name: Kelsey Rick  MRN: [id number]  Admission Date: 2025  Hospital Length of Stay: 3 days  Discharge Date and Time:  2025 2:31 PM  Attending Physician: Mayra Arcos,*   Discharging Provider: Mayra Arcos MD, MD   Primary Care Provider: No, Primary Doctor      Hospital Course:   29-year-old G3 P 101 1 at 37 weeks and 2 days presented for induction of labor for preeclampsia, no severe features.  Mother and infant recovering well.  On postpartum day 1 she was meeting all goals and desired discharge. Pain well controlled, tolerating regular diet, ambulating and voiding without difficulty, passing flatus, no heavy bleeding. VSS and exam reassuring.  Precautions reviewed with patient and she will follow up with me.    Vitals:    25 2325 25 0322 25 0751 25 1151   BP: 135/84 135/87 (!) 143/89 135/81   BP Location:   Right arm Right arm   Patient Position:   Sitting Sitting   Pulse: 74 69 78 80   Resp: 17 18 18 18   Temp: 97.9 °F (36.6 °C) 97.6 °F (36.4 °C) 97.4 °F (36.3 °C) 97.3 °F (36.3 °C)   TempSrc: Oral Oral Oral Oral   SpO2: 97% 96% 99% 97%   Weight:       Height:            Exam:  NAD, AAO  Regular rate  Nonlabored respirations  Ab: fundus firm below the umbilicus, appropriate abdominal tenderness  : appropriate lochia  Extremities:  Nontender calves, no evidence of DVT           Final Active Diagnoses:    Diagnosis Date Noted POA    PRINCIPAL PROBLEM:   (spontaneous vaginal delivery) [O80] 2025 Not Applicable    Preeclampsia, third trimester [O14.93] 2025 Yes      Problems Resolved During this Admission:        Significant Diagnostic Studies: Labs: CMP   Recent Labs   Lab 25  0421      K 3.2*      CO2 23   GLU 82   BUN 3*   CREATININE 0.5   CALCIUM 8.2*   PROT 4.7*   ALBUMIN 2.7*   BILITOT 0.2   ALKPHOS 83   AST 17   ALT 8*   ANIONGAP 7*   , CBC   Recent  "Labs   Lab 25  0421   WBC 13.31*   HGB 10.2*   HCT 29.5*      , and All labs within the past 24 hours have been reviewed  Lab Results   Component Value Date    GROUPTRH O POS 2025         Feeding Method: breast    Immunizations       Date Immunization Status Dose Route/Site Given by    25 1215 MMR Incomplete 0.5 mL Subcutaneous/     25 1215 Tdap Incomplete 0.5 mL Intramuscular/             Delivery:    Episiotomy: None   Lacerations: None   Repair suture: None   Repair # of packets:     Blood loss (ml):       Birth information:  YOB: 2027   Time of birth: 11:05 AM   Sex: male   Delivery type: Vaginal, Spontaneous   Gestational Age: 37w1d     Measurements    Weight: 3635 g  Weight (lbs): 8 lb 0.2 oz  Length: 52.1 cm  Length (in): 20.5"  Head circumference: 38 cm  Chest circumference: 34 cm  Abdominal girth: 32 cm         Delivery Clinician: Delivery Providers    Delivering clinician: Mayra Arcos MD   Provider Role    Seda Lozano RN Delivery Nurse    [facility]    Nan Torers RN Nurse             Additional  information:  Forceps:    Vacuum:    Breech:    Observed anomalies Boy Kelsey Rick born at 37w1d Infant male was born on 2027 at 11:05 AM via Vaginal, Spontaneous. The mother is a 29 y.o.. She has no past medical history on file.    Apgars 9 at one minute and 9 at 5 minutes.     ROM 16 hours PTD clear/meconium.  Mom positive for GBBS.  Maternal labs negative for HIV, HepB, RPR, GC, Chlamydia, and Rubella I. There is no history of maternal substance abuse. Moms blood type O pos, Baby is O pos grady neg.  Birth weight 3635 grams.  Baby has voided.           Living?:     Apgars    Living status: Living  Apgar Component Scores:  1 min.:  5 min.:  10 min.:  15 min.:  20 min.:    Skin color:  1  1       Heart rate:  2  2       Reflex irritability:  2  2       Muscle tone:  2  2       Respiratory " effort:  2  2       Total:  9  9       Apgars assigned by: DHRUV FRIAS RN         Placenta: Delivered:       appearance  Pending Diagnostic Studies:       None            Discharged Condition: good    Disposition: Home or Self Care    Follow Up:   Follow-up Information       Mayra Arcos MD Follow up in 1 week(s).    Specialty: Obstetrics and Gynecology  Why: Postpartum check, BP check  Contact information:  [address] [id number] [phone]                           Patient Instructions:      Diet Adult Regular     Pelvic Rest     Notify your health care provider if you experience any of the following:  temperature >100.4     Notify your health care provider if you experience any of the following:  persistent nausea and vomiting or diarrhea     Notify your health care provider if you experience any of the following:  severe uncontrolled pain     Notify your health care provider if you experience any of the following:  difficulty breathing or increased cough     Notify your health care provider if you experience any of the following:  severe persistent headache     Notify your health care provider if you experience any of the following:  worsening rash     Notify your health care provider if you experience any of the following:  persistent dizziness, light-headedness, or visual disturbances     Notify your health care provider if you experience any of the following:  increased confusion or weakness     Activity as tolerated     Medications:  Current Discharge Medication List        START taking these medications    Details   ibuprofen (ADVIL,MOTRIN) 800 MG tablet Take 1 tablet (800 mg total) by mouth every 6 (six) hours as needed (cramping).  Qty: 60 tablet, Refills: 0           CONTINUE these medications which have NOT CHANGED    Details   multivitamin (ONE DAILY MULTIVITAMIN) per tablet Take 1 tablet by mouth once daily.             Mayra Arcos MD,  MD  Obstetrics  [facility]

## 2025-02-01 VITALS
TEMPERATURE: 98 F | RESPIRATION RATE: 16 BRPM | HEART RATE: 72 BPM | OXYGEN SATURATION: 97 % | HEIGHT: 64 IN | DIASTOLIC BLOOD PRESSURE: 77 MMHG | WEIGHT: 202 LBS | BODY MASS INDEX: 34.49 KG/M2 | SYSTOLIC BLOOD PRESSURE: 123 MMHG

## 2025-02-01 NOTE — PLAN OF CARE
Problem: Adult Inpatient Plan of Care  Goal: Plan of Care Review  Outcome: Met  Goal: Patient-Specific Goal (Individualized)  Outcome: Met  Goal: Absence of Hospital-Acquired Illness or Injury  Outcome: Met  Goal: Optimal Comfort and Wellbeing  Outcome: Met  Goal: Readiness for Transition of Care  Outcome: Met     Problem:  Fall Injury Risk  Goal: Absence of Fall, Infant Drop and Related Injury  Outcome: Met     Problem: Infection  Goal: Absence of Infection Signs and Symptoms  Outcome: Met     Problem: Postpartum (Vaginal Delivery)  Goal: Successful Parent Role Transition  Outcome: Met  Goal: Hemostasis  Outcome: Met  Goal: Absence of Infection Signs and Symptoms  Outcome: Met  Goal: Anesthesia/Sedation Recovery  Outcome: Met  Goal: Optimal Pain Control and Function  Outcome: Met  Goal: Effective Urinary Elimination  Outcome: Met     Problem: Breastfeeding  Goal: Effective Breastfeeding  Outcome: Met

## 2025-02-01 NOTE — DISCHARGE INSTRUCTIONS

## 2025-02-04 ENCOUNTER — HOSPITAL ENCOUNTER (INPATIENT)
Facility: HOSPITAL | Age: 30
LOS: 1 days | Discharge: HOME | DRG: 776 | End: 2025-02-05
Attending: EMERGENCY MEDICINE | Admitting: STUDENT IN AN ORGANIZED HEALTH CARE EDUCATION/TRAINING PROGRAM
Payer: COMMERCIAL

## 2025-02-04 DIAGNOSIS — R31.9 URINARY TRACT INFECTION WITH HEMATURIA, SITE UNSPECIFIED: ICD-10-CM

## 2025-02-04 DIAGNOSIS — O14.93 PREECLAMPSIA, THIRD TRIMESTER: Primary | ICD-10-CM

## 2025-02-04 DIAGNOSIS — I10 HYPERTENSION: ICD-10-CM

## 2025-02-04 DIAGNOSIS — N39.0 URINARY TRACT INFECTION WITH HEMATURIA, SITE UNSPECIFIED: ICD-10-CM

## 2025-02-04 LAB
ALBUMIN SERPL BCP-MCNC: 3.8 G/DL (ref 3.5–5.2)
ALP SERPL-CCNC: 93 U/L (ref 55–135)
ALT SERPL W/O P-5'-P-CCNC: 20 U/L (ref 10–44)
ANION GAP SERPL CALC-SCNC: 7 MMOL/L (ref 8–16)
AST SERPL-CCNC: 22 U/L (ref 10–40)
BACTERIA #/AREA URNS HPF: ABNORMAL /HPF
BASOPHILS # BLD AUTO: 0.05 K/UL (ref 0–0.2)
BASOPHILS NFR BLD: 0.5 % (ref 0–1.9)
BILIRUB SERPL-MCNC: 0.3 MG/DL (ref 0.1–1)
BILIRUB UR QL STRIP: NEGATIVE
BNP SERPL-MCNC: 94 PG/ML (ref 0–99)
BUN SERPL-MCNC: 11 MG/DL (ref 6–20)
CALCIUM SERPL-MCNC: 9.7 MG/DL (ref 8.7–10.5)
CAOX CRY URNS QL MICRO: ABNORMAL
CHLORIDE SERPL-SCNC: 105 MMOL/L (ref 95–110)
CLARITY UR: ABNORMAL
CO2 SERPL-SCNC: 28 MMOL/L (ref 23–29)
COLOR UR: ABNORMAL
CREAT SERPL-MCNC: 0.6 MG/DL (ref 0.5–1.4)
CREAT UR-MCNC: 19.6 MG/DL (ref 15–325)
DIFFERENTIAL METHOD BLD: ABNORMAL
EOSINOPHIL # BLD AUTO: 0.3 K/UL (ref 0–0.5)
EOSINOPHIL NFR BLD: 2.7 % (ref 0–8)
ERYTHROCYTE [DISTWIDTH] IN BLOOD BY AUTOMATED COUNT: 13.2 % (ref 11.5–14.5)
EST. GFR  (NO RACE VARIABLE): >60 ML/MIN/1.73 M^2
GLUCOSE SERPL-MCNC: 92 MG/DL (ref 70–110)
GLUCOSE UR QL STRIP: NEGATIVE
HCT VFR BLD AUTO: 38.5 % (ref 37–48.5)
HCV AB SERPL QL IA: NEGATIVE
HGB BLD-MCNC: 12.9 G/DL (ref 12–16)
HGB UR QL STRIP: ABNORMAL
HYALINE CASTS #/AREA URNS LPF: 0 /LPF
IMM GRANULOCYTES # BLD AUTO: 0.06 K/UL (ref 0–0.04)
IMM GRANULOCYTES NFR BLD AUTO: 0.6 % (ref 0–0.5)
KETONES UR QL STRIP: NEGATIVE
LDH SERPL L TO P-CCNC: 213 U/L (ref 110–260)
LEUKOCYTE ESTERASE UR QL STRIP: ABNORMAL
LYMPHOCYTES # BLD AUTO: 2.8 K/UL (ref 1–4.8)
LYMPHOCYTES NFR BLD: 29.4 % (ref 18–48)
MAGNESIUM SERPL-MCNC: 1.9 MG/DL (ref 1.6–2.6)
MCH RBC QN AUTO: 30.3 PG (ref 27–31)
MCHC RBC AUTO-ENTMCNC: 33.5 G/DL (ref 32–36)
MCV RBC AUTO: 90 FL (ref 82–98)
MICROSCOPIC COMMENT: ABNORMAL
MONOCYTES # BLD AUTO: 0.6 K/UL (ref 0.3–1)
MONOCYTES NFR BLD: 6.3 % (ref 4–15)
NEUTROPHILS # BLD AUTO: 5.7 K/UL (ref 1.8–7.7)
NEUTROPHILS NFR BLD: 60.5 % (ref 38–73)
NITRITE UR QL STRIP: NEGATIVE
NRBC BLD-RTO: 0 /100 WBC
PH UR STRIP: 8 [PH] (ref 5–8)
PLATELET # BLD AUTO: 326 K/UL (ref 150–450)
PLATELET BLD QL SMEAR: ABNORMAL
PMV BLD AUTO: 9 FL (ref 9.2–12.9)
POTASSIUM SERPL-SCNC: 3.4 MMOL/L (ref 3.5–5.1)
PROT SERPL-MCNC: 7 G/DL (ref 6–8.4)
PROT UR QL STRIP: ABNORMAL
PROT UR-MCNC: 80 MG/DL (ref 6–15)
PROT/CREAT UR: 4.08 MG/G{CREAT} (ref 0–0.2)
RBC # BLD AUTO: 4.26 M/UL (ref 4–5.4)
RBC #/AREA URNS HPF: 57 /HPF (ref 0–4)
SODIUM SERPL-SCNC: 140 MMOL/L (ref 136–145)
SP GR UR STRIP: 1 (ref 1–1.03)
SQUAMOUS #/AREA URNS HPF: 11 /HPF
TROPONIN I SERPL HS-MCNC: 5.1 PG/ML (ref 0–14.9)
URATE SERPL-MCNC: 4.5 MG/DL (ref 2.4–5.7)
URN SPEC COLLECT METH UR: ABNORMAL
UROBILINOGEN UR STRIP-ACNC: NEGATIVE EU/DL
WBC # BLD AUTO: 9.46 K/UL (ref 3.9–12.7)
WBC #/AREA URNS HPF: 56 /HPF (ref 0–5)
WBC CLUMPS URNS QL MICRO: ABNORMAL

## 2025-02-04 PROCEDURE — 93005 ELECTROCARDIOGRAM TRACING: CPT | Performed by: INTERNAL MEDICINE

## 2025-02-04 PROCEDURE — 99285 EMERGENCY DEPT VISIT HI MDM: CPT | Mod: 25

## 2025-02-04 PROCEDURE — 86803 HEPATITIS C AB TEST: CPT | Performed by: EMERGENCY MEDICINE

## 2025-02-04 PROCEDURE — 84550 ASSAY OF BLOOD/URIC ACID: CPT

## 2025-02-04 PROCEDURE — 84156 ASSAY OF PROTEIN URINE: CPT | Performed by: EMERGENCY MEDICINE

## 2025-02-04 PROCEDURE — 84484 ASSAY OF TROPONIN QUANT: CPT | Performed by: PHYSICIAN ASSISTANT

## 2025-02-04 PROCEDURE — 96375 TX/PRO/DX INJ NEW DRUG ADDON: CPT

## 2025-02-04 PROCEDURE — 87086 URINE CULTURE/COLONY COUNT: CPT

## 2025-02-04 PROCEDURE — 96374 THER/PROPH/DIAG INJ IV PUSH: CPT

## 2025-02-04 PROCEDURE — 25000003 PHARM REV CODE 250: Performed by: STUDENT IN AN ORGANIZED HEALTH CARE EDUCATION/TRAINING PROGRAM

## 2025-02-04 PROCEDURE — 83615 LACTATE (LD) (LDH) ENZYME: CPT

## 2025-02-04 PROCEDURE — 36415 COLL VENOUS BLD VENIPUNCTURE: CPT

## 2025-02-04 PROCEDURE — 83735 ASSAY OF MAGNESIUM: CPT

## 2025-02-04 PROCEDURE — 81001 URINALYSIS AUTO W/SCOPE: CPT

## 2025-02-04 PROCEDURE — 83880 ASSAY OF NATRIURETIC PEPTIDE: CPT | Performed by: PHYSICIAN ASSISTANT

## 2025-02-04 PROCEDURE — 80053 COMPREHEN METABOLIC PANEL: CPT | Performed by: PHYSICIAN ASSISTANT

## 2025-02-04 PROCEDURE — 25000003 PHARM REV CODE 250

## 2025-02-04 PROCEDURE — 63600175 PHARM REV CODE 636 W HCPCS

## 2025-02-04 PROCEDURE — 63600175 PHARM REV CODE 636 W HCPCS: Performed by: STUDENT IN AN ORGANIZED HEALTH CARE EDUCATION/TRAINING PROGRAM

## 2025-02-04 PROCEDURE — 85025 COMPLETE CBC W/AUTO DIFF WBC: CPT | Performed by: PHYSICIAN ASSISTANT

## 2025-02-04 PROCEDURE — 12000002 HC ACUTE/MED SURGE SEMI-PRIVATE ROOM

## 2025-02-04 PROCEDURE — 87186 SC STD MICRODIL/AGAR DIL: CPT

## 2025-02-04 PROCEDURE — 93010 ELECTROCARDIOGRAM REPORT: CPT | Mod: ,,, | Performed by: INTERNAL MEDICINE

## 2025-02-04 RX ORDER — MAGNESIUM SULFATE HEPTAHYDRATE 40 MG/ML
2 INJECTION, SOLUTION INTRAVENOUS ONCE
Status: DISCONTINUED | OUTPATIENT
Start: 2025-02-04 | End: 2025-02-05 | Stop reason: HOSPADM

## 2025-02-04 RX ORDER — CALCIUM CARBONATE 200(500)MG
500 TABLET,CHEWABLE ORAL 3 TIMES DAILY PRN
Status: DISCONTINUED | OUTPATIENT
Start: 2025-02-04 | End: 2025-02-05 | Stop reason: HOSPADM

## 2025-02-04 RX ORDER — LABETALOL HYDROCHLORIDE 5 MG/ML
40 INJECTION, SOLUTION INTRAVENOUS ONCE AS NEEDED
Status: DISCONTINUED | OUTPATIENT
Start: 2025-02-04 | End: 2025-02-05 | Stop reason: HOSPADM

## 2025-02-04 RX ORDER — HYDRALAZINE HYDROCHLORIDE 20 MG/ML
5 INJECTION INTRAMUSCULAR; INTRAVENOUS ONCE AS NEEDED
Status: DISCONTINUED | OUTPATIENT
Start: 2025-02-04 | End: 2025-02-05 | Stop reason: HOSPADM

## 2025-02-04 RX ORDER — MAGNESIUM SULFATE HEPTAHYDRATE 40 MG/ML
2 INJECTION, SOLUTION INTRAVENOUS CONTINUOUS
Status: DISCONTINUED | OUTPATIENT
Start: 2025-02-04 | End: 2025-02-05 | Stop reason: HOSPADM

## 2025-02-04 RX ORDER — LABETALOL HYDROCHLORIDE 5 MG/ML
20 INJECTION, SOLUTION INTRAVENOUS ONCE AS NEEDED
Status: DISCONTINUED | OUTPATIENT
Start: 2025-02-04 | End: 2025-02-05 | Stop reason: HOSPADM

## 2025-02-04 RX ORDER — LABETALOL HYDROCHLORIDE 5 MG/ML
80 INJECTION, SOLUTION INTRAVENOUS ONCE AS NEEDED
Status: DISCONTINUED | OUTPATIENT
Start: 2025-02-04 | End: 2025-02-05 | Stop reason: HOSPADM

## 2025-02-04 RX ORDER — ACETAMINOPHEN 500 MG
1000 TABLET ORAL
Status: COMPLETED | OUTPATIENT
Start: 2025-02-04 | End: 2025-02-04

## 2025-02-04 RX ORDER — LABETALOL HYDROCHLORIDE 5 MG/ML
10 INJECTION, SOLUTION INTRAVENOUS
Status: COMPLETED | OUTPATIENT
Start: 2025-02-04 | End: 2025-02-04

## 2025-02-04 RX ORDER — LABETALOL 200 MG/1
200 TABLET, FILM COATED ORAL EVERY 12 HOURS
Status: DISCONTINUED | OUTPATIENT
Start: 2025-02-04 | End: 2025-02-05 | Stop reason: HOSPADM

## 2025-02-04 RX ORDER — ONDANSETRON HYDROCHLORIDE 2 MG/ML
4 INJECTION, SOLUTION INTRAVENOUS EVERY 6 HOURS PRN
Status: DISCONTINUED | OUTPATIENT
Start: 2025-02-04 | End: 2025-02-05 | Stop reason: HOSPADM

## 2025-02-04 RX ORDER — SODIUM CHLORIDE, SODIUM LACTATE, POTASSIUM CHLORIDE, CALCIUM CHLORIDE 600; 310; 30; 20 MG/100ML; MG/100ML; MG/100ML; MG/100ML
INJECTION, SOLUTION INTRAVENOUS CONTINUOUS
Status: DISCONTINUED | OUTPATIENT
Start: 2025-02-04 | End: 2025-02-05 | Stop reason: HOSPADM

## 2025-02-04 RX ORDER — CEFTRIAXONE 1 G/1
1 INJECTION, POWDER, FOR SOLUTION INTRAMUSCULAR; INTRAVENOUS ONCE
Status: COMPLETED | OUTPATIENT
Start: 2025-02-04 | End: 2025-02-04

## 2025-02-04 RX ORDER — HYDRALAZINE HYDROCHLORIDE 20 MG/ML
10 INJECTION INTRAMUSCULAR; INTRAVENOUS ONCE AS NEEDED
Status: DISCONTINUED | OUTPATIENT
Start: 2025-02-04 | End: 2025-02-05 | Stop reason: HOSPADM

## 2025-02-04 RX ORDER — MAGNESIUM SULFATE HEPTAHYDRATE 40 MG/ML
4 INJECTION, SOLUTION INTRAVENOUS ONCE
Status: COMPLETED | OUTPATIENT
Start: 2025-02-04 | End: 2025-02-04

## 2025-02-04 RX ORDER — CALCIUM GLUCONATE 98 MG/ML
1 INJECTION, SOLUTION INTRAVENOUS
Status: DISCONTINUED | OUTPATIENT
Start: 2025-02-04 | End: 2025-02-05 | Stop reason: HOSPADM

## 2025-02-04 RX ADMIN — LABETALOL HYDROCHLORIDE 10 MG: 5 INJECTION, SOLUTION INTRAVENOUS at 01:02

## 2025-02-04 RX ADMIN — MAGNESIUM SULFATE HEPTAHYDRATE 4 G: 40 INJECTION, SOLUTION INTRAVENOUS at 05:02

## 2025-02-04 RX ADMIN — MAGNESIUM SULFATE HEPTAHYDRATE 2 G/HR: 40 INJECTION, SOLUTION INTRAVENOUS at 05:02

## 2025-02-04 RX ADMIN — SODIUM CHLORIDE, POTASSIUM CHLORIDE, SODIUM LACTATE AND CALCIUM CHLORIDE: 600; 310; 30; 20 INJECTION, SOLUTION INTRAVENOUS at 05:02

## 2025-02-04 RX ADMIN — ACETAMINOPHEN 1000 MG: 500 TABLET, FILM COATED ORAL at 01:02

## 2025-02-04 RX ADMIN — LABETALOL HYDROCHLORIDE 200 MG: 200 TABLET, FILM COATED ORAL at 05:02

## 2025-02-04 RX ADMIN — CEFTRIAXONE 1 G: 1 INJECTION, POWDER, FOR SOLUTION INTRAMUSCULAR; INTRAVENOUS at 04:02

## 2025-02-04 NOTE — FIRST PROVIDER EVALUATION
Emergency Department TeleTriage Encounter Note      CHIEF COMPLAINT    Chief Complaint   Patient presents with    Hypertension     Pt is 5 days post vag delivery. Induced 3 weeks early d/t HTN. Pt is here today for HTN. States minimal bleeding and discomfort.       VITAL SIGNS   Initial Vitals [02/04/25 1059]   BP Pulse Resp Temp SpO2   (!) 181/111 87 18 99 °F (37.2 °C) 97 %      MAP       --            ALLERGIES    Review of patient's allergies indicates:  No Known Allergies    PROVIDER TRIAGE NOTE  Patient is 3 days post-partum presenting with hypertension. She reports BP was high during pregnancy, but never >140/90 so no meds. Denies chest pain, SOB or edema.       ORDERS  Labs Reviewed   HEPATITIS C ANTIBODY       ED Orders (720h ago, onward)      Start Ordered     Status Ordering Provider    02/04/25 1102 02/04/25 1101  Hepatitis C Antibody  STAT         Ordered SU HINOJOSA              Virtual Visit Note: The provider triage portion of this emergency department evaluation and documentation was performed via [employer], a HIPAA-compliant telemedicine application, in concert with a tele-presenter in the room. A face to face patient evaluation with one of my colleagues will occur once the patient is placed in an emergency department room.      DISCLAIMER: This note was prepared with [employer] voice recognition transcription software. Garbled syntax, mangled pronouns, and other bizarre constructions may be attributed to that software system.

## 2025-02-04 NOTE — ED PROVIDER NOTES
Encounter Date: 2025       History     Chief Complaint   Patient presents with    Hypertension     Pt is 5 days post vag delivery. Induced 3 weeks early d/t HTN. Pt is here today for HTN. States minimal bleeding and discomfort.     HPI    29-year-old  with past medical history of gestational hypertension, presented to emergency department for hypertension.  Patient was having headache last night and decided to check her blood pressure and found to have a systolics in the 150s.  Felt a little lightheaded along with a headache.  Did not take anything for it.  Patient is status post 5 days vaginal delivery.  Was induced at 37 weeks due to hypertension/possible preeclampsia.  Endorses headache.  Denies lightheadedness, chest pain, decreased urine output, right upper quadrant pain, shortness of breath, leg edema, fevers.  Not prescribed any blood pressure meds.      Review of patient's allergies indicates:  No Known Allergies  No past medical history on file.  Past Surgical History:   Procedure Laterality Date    TONSILLECTOMY      WISDOM TOOTH EXTRACTION       No family history on file.  Social History     Tobacco Use    Smoking status: Never    Smokeless tobacco: Never   Substance Use Topics    Alcohol use: Not Currently    Drug use: Never     Review of Systems    See HPI, otherwise negative    Physical Exam     Initial Vitals [25 1059]   BP Pulse Resp Temp SpO2   (!) 181/111 87 18 99 °F (37.2 °C) 97 %      MAP       --         Physical Exam    Nursing note and vitals reviewed.  Constitutional: She appears well-developed and well-nourished.   HENT:   Head: Normocephalic and atraumatic.   Eyes: Conjunctivae and EOM are normal. Pupils are equal, round, and reactive to light.   Neck: Neck supple.   Normal range of motion.  Cardiovascular:  Normal rate and regular rhythm.           No murmur heard.  Pulmonary/Chest: Breath sounds normal. She has no rales.   Abdominal: Abdomen is soft. She exhibits no  distension. There is no rebound.   Musculoskeletal:         General: Normal range of motion.      Cervical back: Normal range of motion and neck supple.     Neurological: She is alert and oriented to person, place, and time. She has normal strength. No cranial nerve deficit or sensory deficit. GCS score is 15. GCS eye subscore is 4. GCS verbal subscore is 5. GCS motor subscore is 6.   Skin: Skin is warm. Capillary refill takes less than 2 seconds.   Psychiatric: She has a normal mood and affect.         ED Course   Procedures  Labs Reviewed   CBC W/ AUTO DIFFERENTIAL - Abnormal       Result Value    WBC 9.46      RBC 4.26      Hemoglobin 12.9      Hematocrit 38.5      MCV 90      MCH 30.3      MCHC 33.5      RDW 13.2      Platelets 326      MPV 9.0 (*)     Immature Granulocytes 0.6 (*)     Gran # (ANC) 5.7      Immature Grans (Abs) 0.06 (*)     Lymph # 2.8      Mono # 0.6      Eos # 0.3      Baso # 0.05      nRBC 0      Gran % 60.5      Lymph % 29.4      Mono % 6.3      Eosinophil % 2.7      Basophil % 0.5      Platelet Estimate Appears normal      Differential Method Automated     COMPREHENSIVE METABOLIC PANEL - Abnormal    Sodium 140      Potassium 3.4 (*)     Chloride 105      CO2 28      Glucose 92      BUN 11      Creatinine 0.6      Calcium 9.7      Total Protein 7.0      Albumin 3.8      Total Bilirubin 0.3      Alkaline Phosphatase 93      AST 22      ALT 20      eGFR >60.0      Anion Gap 7 (*)    URINALYSIS, REFLEX TO URINE CULTURE - Abnormal    Specimen UA Urine, Clean Catch      Color, UA Brown (*)     Appearance, UA Hazy (*)     pH, UA 8.0      Specific Gravity, UA 1.005      Protein, UA 1+ (*)     Glucose, UA Negative      Ketones, UA Negative      Bilirubin (UA) Negative      Occult Blood UA 3+ (*)     Nitrite, UA Negative      Urobilinogen, UA Negative      Leukocytes, UA 3+ (*)     Narrative:     Specimen Source->Urine   URINALYSIS MICROSCOPIC - Abnormal    RBC, UA 57 (*)     WBC, UA 56 (*)     WBC  Clumps, UA Few (*)     Bacteria Rare      Squam Epithel, UA 11      Hyaline Casts, UA 0      Ca Oxalate Stacey, UA Occasional      Microscopic Comment SEE COMMENT      Narrative:     Specimen Source->Urine   CULTURE, URINE   B-TYPE NATRIURETIC PEPTIDE    BNP 94     TROPONIN I HIGH SENSITIVITY    Troponin I High Sensitivity 5.1     MAGNESIUM   LACTATE DEHYDROGENASE   URIC ACID   LACTATE DEHYDROGENASE         MAGNESIUM    Magnesium 1.9     URIC ACID    Uric Acid 4.5     PROTEIN / CREATININE RATIO, URINE   HEPATITIS C ANTIBODY        ECG Results              EKG 12-lead (In process)        Collection Time Result Time QRS Duration OHS QTC Calculation    02/04/25 12:34:03 02/04/25 12:48:21 82 408                     In process by Interface, Lab In [facility] (02/04/25 12:48:26)                   Narrative:    Test Reason : I10,    Vent. Rate :  68 BPM     Atrial Rate :  68 BPM     P-R Int : 132 ms          QRS Dur :  82 ms      QT Int : 384 ms       P-R-T Axes :  -4  60  58 degrees    QTcB Int : 408 ms    Normal sinus rhythm  Normal ECG  No previous ECGs available    Referred By: AAAREFERRAL SELF           Confirmed By:                                   Imaging Results              X-Ray Chest AP Portable (Final result)  Result time 02/04/25 13:53:15      Final result by Lenny Iraheta MD (02/04/25 13:53:15)                   Impression:      No evidence of active cardiopulmonary disease.      Electronically signed by: Lenny Iraheta  Date:    02/04/2025  Time:    13:53               Narrative:    EXAMINATION:  XR CHEST AP PORTABLE    CLINICAL HISTORY:  Essential (primary) hypertension    FINDINGS:  Portable chest radiograph at 13:30 hours with no prior studies for comparison shows the cardiomediastinal silhouette and pulmonary vasculature are within normal limits.    The lungs are normally expanded, with no consolidation, large pleural effusion, or evidence of pulmonary edema. No pneumothorax. There are no significant  osseous abnormalities.                                       Medications   magnesium sulfate 2g in water 50mL IVPB (premix) (2 g Intravenous Not Given 25 1700)     Followed by   magnesium sulfate 2g in water 50mL IVPB (premix) (has no administration in time range)   hydrALAZINE injection 5 mg (has no administration in time range)   hydrALAZINE injection 10 mg (has no administration in time range)   labetaloL injection 20 mg (has no administration in time range)   labetaloL injection 40 mg (has no administration in time range)   labetaloL injection 80 mg (has no administration in time range)   magnesium sulfate in water 40 gram/1,000 mL (4 %) infusion 4 g (4 g Intravenous New Bag 25 1706)     And   lactated ringers infusion ( Intravenous New Bag 25 1710)   magnesium sulfate in water 40 gram/1,000 mL (4 %) infusion (2 g/hr Intravenous New Bag 25 1723)   calcium gluconate 100 mg/mL (10%) injection 1 g (has no administration in time range)   labetaloL tablet 200 mg (200 mg Oral Given 25 1757)   acetaminophen tablet 1,000 mg (1,000 mg Oral Given 25 1341)   labetaloL injection 10 mg (10 mg Intravenous Given 25 1354)   cefTRIAXone injection 1 g (1 g Intravenous Given 25 1631)     Medical Decision Making  Problems Addressed:  Hypertension: acute illness or injury  Preeclampsia in postpartum period: acute illness or injury  Urinary tract infection with hematuria, site unspecified: acute illness or injury    Amount and/or Complexity of Data Reviewed  External Data Reviewed: labs and notes.     Details: Reviewed OB/GYN notes  Labs: ordered.     Details: See narrative below  Radiology: ordered and independent interpretation performed.     Details: See narrative below  ECG/medicine tests: ordered and independent interpretation performed.     Details: See narrative below    Risk  OTC drugs.  Prescription drug management.  Decision regarding hospitalization.       In brief, 29-year-old  with  past medical history of gestational hypertension, status post vaginal delivery with induction due to hypertension, presented to emergency department for headache in the setting of hypertension.    Patient found to be hypertensive at 181/111, other vitals stable the patient nontoxic appearing.  Physical exam per above.    Differentials include ongoing gestational hypertension, preeclampsia, among others.    Labs and imaging individually interpreted by me:    CBC with no leukocytosis.  Hemoglobin stable.  CMP with no electrolyte derangements with a normal creatinine.  No transaminitis troponin, LDH, BNP all normal.  Chest x-ray with no acute cardiopulmonary disease.  UA noted for protein.  Also noted for WBCs.  EKG normal sinus rhythm with no ST elevations or depressions.    Interventions include Tylenol for headache, ceftriaxone to treat UTI.  IV labetalol given for patient's blood pressure    OB/GYN consulted, recommendations appreciated.  Due to multiple readings above 160 systolics, will load with IV magnesium get a protein creatinine ratio lab.  We will admit to labor and delivery service.    Patient reassessed with improvement of headache and blood pressure. Patient transferred to labor and delivery service.    Aleshia Lau MD  [employer] EM PGY4                                   Clinical Impression:  Final diagnoses:  [I10] Hypertension  [N39.0, R31.9] Urinary tract infection with hematuria, site unspecified (Primary)  [O14.95] Preeclampsia in postpartum period          ED Disposition Condition    Send to L&D Aleshia Griffith MD  Resident  02/04/25 8861

## 2025-02-05 VITALS
RESPIRATION RATE: 18 BRPM | OXYGEN SATURATION: 100 % | BODY MASS INDEX: 31.89 KG/M2 | DIASTOLIC BLOOD PRESSURE: 75 MMHG | HEART RATE: 88 BPM | SYSTOLIC BLOOD PRESSURE: 149 MMHG | HEIGHT: 64 IN | TEMPERATURE: 99 F | WEIGHT: 186.81 LBS

## 2025-02-05 PROCEDURE — 25000003 PHARM REV CODE 250: Performed by: STUDENT IN AN ORGANIZED HEALTH CARE EDUCATION/TRAINING PROGRAM

## 2025-02-05 PROCEDURE — 63600175 PHARM REV CODE 636 W HCPCS: Performed by: STUDENT IN AN ORGANIZED HEALTH CARE EDUCATION/TRAINING PROGRAM

## 2025-02-05 RX ORDER — ACETAMINOPHEN 500 MG
1000 TABLET ORAL EVERY 8 HOURS PRN
Status: DISCONTINUED | OUTPATIENT
Start: 2025-02-05 | End: 2025-02-05 | Stop reason: HOSPADM

## 2025-02-05 RX ORDER — NITROFURANTOIN 25; 75 MG/1; MG/1
100 CAPSULE ORAL 2 TIMES DAILY
Qty: 13 CAPSULE | Refills: 0 | Status: SHIPPED | OUTPATIENT
Start: 2025-02-05

## 2025-02-05 RX ORDER — LABETALOL 200 MG/1
200 TABLET, FILM COATED ORAL EVERY 12 HOURS
Qty: 60 TABLET | Refills: 2 | Status: SHIPPED | OUTPATIENT
Start: 2025-02-05 | End: 2026-02-05

## 2025-02-05 RX ORDER — NITROFURANTOIN 25; 75 MG/1; MG/1
100 CAPSULE ORAL EVERY 12 HOURS
Status: DISCONTINUED | OUTPATIENT
Start: 2025-02-05 | End: 2025-02-05 | Stop reason: HOSPADM

## 2025-02-05 RX ORDER — IBUPROFEN 400 MG/1
800 TABLET ORAL EVERY 6 HOURS PRN
Status: DISCONTINUED | OUTPATIENT
Start: 2025-02-05 | End: 2025-02-05 | Stop reason: HOSPADM

## 2025-02-05 RX ORDER — NITROFURANTOIN 25; 75 MG/1; MG/1
100 CAPSULE ORAL EVERY 12 HOURS
Status: DISCONTINUED | OUTPATIENT
Start: 2025-02-05 | End: 2025-02-05

## 2025-02-05 RX ADMIN — SODIUM CHLORIDE, POTASSIUM CHLORIDE, SODIUM LACTATE AND CALCIUM CHLORIDE: 600; 310; 30; 20 INJECTION, SOLUTION INTRAVENOUS at 06:02

## 2025-02-05 RX ADMIN — ACETAMINOPHEN 1000 MG: 500 TABLET, FILM COATED ORAL at 06:02

## 2025-02-05 RX ADMIN — LABETALOL HYDROCHLORIDE 200 MG: 200 TABLET, FILM COATED ORAL at 08:02

## 2025-02-05 RX ADMIN — NITROFURANTOIN MONOHYDRATE/MACROCRYSTALLINE 100 MG: 25; 75 CAPSULE ORAL at 03:02

## 2025-02-05 RX ADMIN — MAGNESIUM SULFATE HEPTAHYDRATE 2 G/HR: 40 INJECTION, SOLUTION INTRAVENOUS at 06:02

## 2025-02-05 NOTE — DISCHARGE INSTRUCTIONS
****  FOLLOWUP WITH DR REED FRIDAY FOR BLOOD PRESSURE CHECK  ****    Pelvic rest for 6 weeks (no sex, tampons, douching, nothing in the vagina)    You can experience vaginal bleeding on and off for up to 6 weeks, it will gradually get lighter and the color will change from bright red to a brownish discharge towards the end.    Activity:  NO strenuous activities or exercising.  Do not /lift anything over 15 pounds.   Do not do heavy housework or cleaning.    NO driving for 3 days.  You may take short car trips but do not drive.    You may shower and/or soak in a bathtub, both are acceptable.  Use a mild soap, no heavy perfumes or fragrances to avoid irritation.     If constipation develops:  You may take Colace (stool softener), Milk of Magnesia, Dulcolax or Miralax.  All of these medications are sold over the counter.      Care of Episiotomy:  Local agents such as Tucks pads & Dermoplast spray.  You may also use a Sitz bath: sitting in a tub of warm water for 15 minutes 2-3 times per day will help relieve the discomfort.      Pain Relief:  You may take Motrin for mild pain & uterine cramping.      Emotional Changes:  You may experience baby blues after delivery.  You may feel let down, anxious and cry easily.  This is normal.  These feelings can begin 2-3 days after delivery and usually disappear in about a week or two.  Prolonged sadness may indicate postpartum depression.      Call your doctor for any of the following:  IF YOU ARE EXPERIENCING SYMPTOMS OF HIGH BLOOD PRESSURE PLEASE FOLLOW UP WITH YOUR PROVIDER WITHIN 3 DAYS OF DISCHARGE  Difficulty breathing, problems with any of your medications, inability to eat.    Foul smelling vaginal discharge.  Temperature above 100.4.    Heavy vaginal bleeding.  All women bleed different after delivery and each delivery is different.  Heavy bleeding consists of saturating a dione pad in a 1 hour time period.  Passing clots are normal, if you pass a blood clot  larger than the size of a golf ball call your doctor's office.   If you experience pain in your legs/calves, if one leg increases in size and becomes swollen or becomes hot to touch or discolored.   Crying or periods of sadness beyond 2 weeks.      If you are breast feeding:  Wash your breasts with mild soap and warm water.  You should wear a supportive bra.  You should continue to take a prenatal vitamin for 6 weeks or until breastfeeding is discontinued.  If nipples are sore, apply a few drops of breast milk after nursing and let air dry or you can use Lanolin cream.   If breasts are engorged, apply warmth and express milk.    If you are not breastfeeding:  Wear a tight bra and do not stimulate your breasts.  Avoid handling your breasts and do not express milk.  You may apply ice packs or cabbage leaves to relieve discomfort from engorgement.  If your breasts become warm to touch, reddened or lumps develop call your doctor.

## 2025-02-05 NOTE — H&P
[facility]  Obstetrics  History & Physical    Patient Name: Kelsey Rick  MRN: [id number]  Admission Date: 2025  Primary Care Provider: No, Primary Doctor    Subjective:     Principal Problem: pp preE with SF    History of Present Illness: 28yo , 5 days pp from  (induced for mild preE), represented with elevated BPs at home and headache. Severe pressures in the ED with headache, admitted for MgSO4 and antihypertensives.      OB History    Para Term  AB Living   3 1 1 0 1 1   SAB IAB Ectopic Multiple Live Births   1 0 0 0 1      # Outcome Date GA Lbr Kristofer/2nd Weight Sex Type Anes PTL Lv   3             2 Term 22 38w0d 16:50 / 00:15 3.275 kg (7 lb 3.5 oz) F Vag-Spont EPI N DANIEL      Name: GENOVEVA SINGH,GIRL KELSEY      Apgar1: 8  Apgar5: 9   1 SAB              No past medical history on file.  Past Surgical History:   Procedure Laterality Date    TONSILLECTOMY      WISDOM TOOTH EXTRACTION         PTA Medications   Medication Sig    ibuprofen (ADVIL,MOTRIN) 800 MG tablet Take 1 tablet (800 mg total) by mouth every 6 (six) hours as needed (cramping).    multivitamin (ONE DAILY MULTIVITAMIN) per tablet Take 1 tablet by mouth once daily.       Review of patient's allergies indicates:  No Known Allergies     Family History    None       Tobacco Use    Smoking status: Never    Smokeless tobacco: Never   Substance and Sexual Activity    Alcohol use: Not Currently    Drug use: Never    Sexual activity: Yes     Partners: Male     Review of Systems   Objective:      0700  02/ 0659     Temp (°F) 97.8-99     Pulse      Resp 16-18     /59 Important -181/111 Important      MAP (mmHg)      SpO2 (%) 93 Important -100     Weight (kg) 84.7      Weight: 84.7 kg (186 lb 12.8 oz)  Body mass index is 32.06 kg/m².    Physical Exam  NAD, AAO  Regular rate  NL respirations  Abd: soft, ND, NT, no RUQ pain  : defer  Extrem: 2+ reflexes    Significant Labs:  Lab Results    Component Value Date    GROUPTRH O POS 01/28/2025    HEPBSAG Negative 11/05/2024    STREPBCULT positive 01/16/2025       I have personallly reviewed all pertinent lab results from the last 24 hours.  Recent Lab Results         02/04/25  1333   02/04/25  1247        Albumin   3.8       ALP   93       ALT   20       Anion Gap   7       Appearance, UA Hazy         AST   22       Bacteria, UA Rare         Baso #   0.05       Basophil %   0.5       Bilirubin (UA) Negative         BILIRUBIN TOTAL   0.3  Comment: For infants and newborns, interpretation of results should be based  on gestational age, weight and in agreement with clinical  observations.    Premature Infant recommended reference ranges:  Up to 24 hours.............<8.0 mg/dL  Up to 48 hours............<12.0 mg/dL  3-5 days..................<15.0 mg/dL  6-29 days.................<15.0 mg/dL         BNP   94  Comment: Values of less than 100 pg/ml are consistent with non-CHF populations.       BUN   11       Ca Oxalate Stacey, UA Occasional         Calcium   9.7       Chloride   105       CO2   28       Color, UA Brown         Creatinine   0.6       Urine Creatinine 19.6  Comment: The random urine reference ranges provided were established   for 24 hour urine collections.  No reference ranges exist for  random urine specimens.  Correlate clinically.           Differential Method   Automated       eGFR   >60.0       Eos #   0.3       Eos %   2.7       Glucose   92       Glucose, UA Negative         Gran # (ANC)   5.7       Gran %   60.5       Hematocrit   38.5       Hemoglobin   12.9       Hepatitis C Ab   Negative       Hyaline Casts, UA 0         Immature Grans (Abs)   0.06  Comment: Mild elevation in immature granulocytes is non specific and   can be seen in a variety of conditions including stress response,   acute inflammation, trauma and pregnancy. Correlation with other   laboratory and clinical findings is essential.         Immature Granulocytes    0.6       Ketones, UA Negative         Lactate Dehydrogenase   213  Comment: Results are increased in hemolyzed samples.       Leukocyte Esterase, UA 3+         Lymph #   2.8       Lymph %   29.4       Magnesium    1.9       MCH   30.3       MCHC   33.5       MCV   90       Microscopic Comment SEE COMMENT  Comment: Other formed elements not mentioned in the report are not   present in the microscopic examination.            Mono #   0.6       Mono %   6.3       MPV   9.0       NITRITE UA Negative         nRBC   0       Blood, UA 3+         pH, UA 8.0         Platelet Estimate   Appears normal       Platelet Count   326  Comment: Delta check review       Potassium   3.4       Urine Protein/Creatinine Ratio 4.08         PROTEIN TOTAL   7.0       Protein, UA 1+  Comment: Recommend a 24 hour urine protein or a urine   protein/creatinine ratio if globulin induced proteinuria is  clinically suspected.           Urine Protein 80  Comment: The random urine reference ranges provided were established   for 24 hour urine collections.  No reference ranges exist for  random urine specimens.  Correlate clinically.           RBC   4.26       RBC, UA 57         RDW   13.2       Sodium   140       Spec Grav UA 1.005         Specimen UA Urine, Clean Catch         Squam Epithel, UA 11         Troponin I High Sensitivity   5.1  Comment: Troponin results differ between methods. Do not use   results between Troponin methods interchangeably.    Alkaline Phospatase levels above 400 U/L may   cause false positive results.    Access hsTnI should not be used for patients taking   Asfotase kameron (Strensiq).         Uric Acid   4.5       Urine Culture GRAM NEGATIVE FRANCK, LACTOSE   >100,000 cfu/ml  Identification and susceptibility pending    [P]         UROBILINOGEN UA Negative         WBC Clumps, UA Few         WBC, UA 56         WBC   9.46                [P] - Preliminary Result               Assessment/Plan:     29 y.o. female   at 37w1d for:    -Postpartum PreE with SF: MgSO4 initiated with PO Labetalol 200mg BID. No longer requiring IV antihypertensives (IV Labetalol given in ED). Labs normal except for proteinuria. Tylenol/Ibuprofen prn for headache. Monitor UOP    Mayra Arcos MD, MD  Obstetrics  [facility]

## 2025-02-05 NOTE — NURSING TRANSFER
Nursing Transfer Note      2/5/2025   12:30 PM    Nurse giving handoff:ANNA Kumari  Nurse receiving handoff:Rose Luke RN    Reason patient is being transferred: post partum mag pt    Transfer To: 2108    Transfer via wheelchair    Transfer with personal belongings    Transported by ANNA Kumari    Transfer Vital Signs:  Blood Pressure:128/82  Heart Rate:74  O2:96  Temperature:98.1  Respirations:16      Telemetry:   Order for Tele Monitor? No    Additional Lines:     Medicines sent: n/a    Any special needs or follow-up needed: none    Patient belongings transferred with patient: Yes    Chart send with patient: Yes    Notified: spouse    Patient reassessed at: 1221 2/5/2025 (date, time)  1  Upon arrival to floor: patient oriented to room, call bell in reach, and bed in lowest position

## 2025-02-05 NOTE — NURSING
0745-Pt assessed by RN    0925- Dr. Arcos reviewed patient's bp. Orders given to stop Mag & monitor for q1-2hrs. Ok to go to the floor if blood pressures are stable.    6906- Dr. Arcos reviewed pt's BP. Ok to go to the floor

## 2025-02-05 NOTE — PROGRESS NOTES
Vaginal Delivery Postpartum Day 6     Kelsey Rick is doing well without complaints. Pain well controlled. Tolerating regular diet. Ambulating and voiding without difficulty. She denies any problems with pp blues. States bleeding is not heavy.     OBJECTIVE:     Vitals:    25 0445 25 0500 25 0545 25 0600   BP: (!) 136/92  (!) 144/80    Pulse: 95 71 100 83   Resp:   18    Temp:       TempSrc:       SpO2: 99% 97% 96% 99%   Weight:       Height:            I & O (Last 24H):  Intake/Output Summary (Last 24 hours)    Intake/Output Summary (Last 24 hours) at 2025 0717  Last data filed at 2025 0600  Gross per 24 hour   Intake 4194.57 ml   Output 4000 ml   Net 194.57 ml         Physical Exam:  General: NAD, AAO   CV: Regular rate   Resp:   Nonlabored respirations   Abdomen: Soft, appropriately tender    Fundus: Firm   Lochia:  Appropriate   DVT Evaluation: No evidence of DVT on either side seen on physical exam.  No calf tenderness     Labs:  CBC:   Lab Results   Component Value Date/Time    WBC 9.46 2025 12:47 PM    RBC 4.26 2025 12:47 PM    HGB 12.9 2025 12:47 PM    HCT 38.5 2025 12:47 PM     2025 12:47 PM    MCV 90 2025 12:47 PM    MCH 30.3 2025 12:47 PM    MCHC 33.5 2025 12:47 PM     CMP:   Lab Results   Component Value Date/Time    GLU 92 2025 12:47 PM    CALCIUM 9.7 2025 12:47 PM    ALBUMIN 3.8 2025 12:47 PM    PROT 7.0 2025 12:47 PM     2025 12:47 PM    K 3.4 (L) 2025 12:47 PM    CO2 28 2025 12:47 PM     2025 12:47 PM    BUN 11 2025 12:47 PM    CREATININE 0.6 2025 12:47 PM    ALKPHOS 93 2025 12:47 PM    ALT 20 2025 12:47 PM    AST 22 2025 12:47 PM    BILITOT 0.3 2025 12:47 PM       ABO/Rh  O POS      ASSESSMENT/PLAN:     S/p vaginal delivery, PPD# 6.   Patient Active Problem List   Diagnosis     (spontaneous vaginal delivery)     Preeclampsia, third trimester        -Postpartum PreE with SF: MgSO4 initiated on admission with PO Labetalol 200mg BID. No longer requiring IV antihypertensives (IV Labetalol given in ED). Labs normal except for proteinuria. No severe symptoms and BPs at goal. Good UOP. Will d/c MgSO4 at 12hrs and continue to monitor  -Dispo: If clinical status continues to improve, will plan for d/c later today       Mayra Arcos MD  Obstetrics and Gynecology  [facility]

## 2025-02-05 NOTE — NURSING
0920-Blood pressures reviewed by Dr. Arcos. Orders given to discontinue Mag & monitor pt for an hour or two for pressures.    1156- Dr. Arcos notified of pt blood pressures. Ok to for pt to go to 2100    1208- Rose Luke given report

## 2025-02-05 NOTE — DISCHARGE SUMMARY
[facility]  Obstetrics & Gynecology  Discharge Summary    Patient Name: Kelsey Rick  MRN: [id number]  Admission Date: 2/4/2025  Hospital Length of Stay: 1 days  Discharge Date and Time: 2/5/2025  5:43 PM  Attending Physician: Myara Arcos,*   Discharging Provider: Mayra Arcos MD, MD  Primary Care Provider: No, Primary Doctor      Hospital Course:   29-year-old G3 P 2-0 1 2 status post vaginal delivery 6 days ago re-presented with headache and severe range blood pressures.  Preeclampsia with severe features diagnosed and she was initiated on magnesium sulfate for seizure prophylaxis and given IV antihypertensives.  She was also initiated on labetalol 200 mg p.o. b.i.d., which stabilized her blood pressures to goal.  Labs normal except for proteinuria.  Headache resolved and she no longer had any severe features for the remainder of her stay.  After 12 hours of magnesium sulfate with appropriate pressures and good urine output, the magnesium sulfate was discontinued.  She continued to have appropriate blood pressures for the remainder of her stay.  After 24 hours of close monitoring she was meeting all criteria for discharge with close follow up with me.  She was also initiated on Macrobid for a UTI.      Consults (From admission, onward)          Status Ordering Provider     Inpatient consult to Obstetrics / Gynecology  Once        Provider:  Katy Lau MD    Acknowledged KATY LAU            Significant Diagnostic Studies:   Latest Reference Range & Units 02/04/25 12:47 02/04/25 13:33   WBC 3.90 - 12.70 K/uL 9.46    RBC 4.00 - 5.40 M/uL 4.26    Hemoglobin 12.0 - 16.0 g/dL 12.9    Hematocrit 37.0 - 48.5 % 38.5    MCV 82 - 98 fL 90    MCH 27.0 - 31.0 pg 30.3    MCHC 32.0 - 36.0 g/dL 33.5    RDW 11.5 - 14.5 % 13.2    Platelet Count 150 - 450 K/uL 326    MPV 9.2 - 12.9 fL 9.0 (L)    Platelet Estimate  Appears normal    Gran % 38.0 - 73.0 % 60.5    Lymph % 18.0 - 48.0 %  29.4    Mono % 4.0 - 15.0 % 6.3    Eos % 0.0 - 8.0 % 2.7    Basophil % 0.0 - 1.9 % 0.5    Immature Granulocytes 0.0 - 0.5 % 0.6 (H)    Gran # (ANC) 1.8 - 7.7 K/uL 5.7    Lymph # 1.0 - 4.8 K/uL 2.8    Mono # 0.3 - 1.0 K/uL 0.6    Eos # 0.0 - 0.5 K/uL 0.3    Baso # 0.00 - 0.20 K/uL 0.05    Immature Grans (Abs) 0.00 - 0.04 K/uL 0.06 (H)    nRBC 0 /100 WBC 0    Differential Method  Automated    Sodium 136 - 145 mmol/L 140    Potassium 3.5 - 5.1 mmol/L 3.4 (L)    Chloride 95 - 110 mmol/L 105    CO2 23 - 29 mmol/L 28    Anion Gap 8 - 16 mmol/L 7 (L)    BUN 6 - 20 mg/dL 11    Creatinine 0.5 - 1.4 mg/dL 0.6    eGFR >60 mL/min/1.73 m^2 >60.0    Glucose 70 - 110 mg/dL 92    Calcium 8.7 - 10.5 mg/dL 9.7    Magnesium  1.6 - 2.6 mg/dL 1.9    ALP 55 - 135 U/L 93    PROTEIN TOTAL 6.0 - 8.4 g/dL 7.0    Albumin 3.5 - 5.2 g/dL 3.8    Uric Acid 2.4 - 5.7 mg/dL 4.5    BILIRUBIN TOTAL 0.1 - 1.0 mg/dL 0.3    AST 10 - 40 U/L 22    ALT 10 - 44 U/L 20    BNP 0 - 99 pg/mL 94    Lactate Dehydrogenase 110 - 260 U/L 213    Troponin I High Sensitivity 0.0 - 14.9 pg/mL 5.1    Hepatitis C Ab Negative  Negative    Specimen UA   Urine, Clean Catch   Color, UA Yellow, Straw, Blossom   Brown !   Appearance, UA Clear   Hazy !   Spec Grav UA 1.005 - 1.030   1.005   pH, UA 5.0 - 8.0   8.0   Protein, UA Negative   1+ !   Glucose, UA Negative   Negative   Ketones, UA Negative   Negative   Blood, UA Negative   3+ !   NITRITE UA Negative   Negative   UROBILINOGEN UA Negative EU/dL  Negative   Bilirubin (UA) Negative   Negative   Leukocyte Esterase, UA Negative   3+ !   RBC, UA 0 - 4 /hpf  57 (H)   WBC, UA 0 - 5 /hpf  56 (H)   Bacteria, UA None-Occ /hpf  Rare   Squam Epithel, UA /hpf  11   WBC Clumps, UA None-Rare   Few !   Hyaline Casts, UA 0-1/lpf /lpf  0   Ca Oxalate Stacey, UA None-Moderate   Occasional   Microscopic Comment   SEE COMMENT   Urine Creatinine 15.0 - 325.0 mg/dL  19.6   Urine Protein 6 - 15 mg/dL  80 (H)   Urine Protein/Creatinine Ratio 0.00 -  0.20   4.08 (H)   CULTURE, URINE   Rpt !   (L): Data is abnormally low  (H): Data is abnormally high  !: Data is abnormal  Rpt: View report in Results Review for more information and All labs within the past 24 hours have been reviewed  Lab Results   Component Value Date    GROUPTRH O POS 01/28/2025         Pending Diagnostic Studies:       None          Final Active Diagnoses:    Diagnosis Date Noted POA    PRINCIPAL PROBLEM:  Preeclampsia in postpartum period [O14.95] 02/05/2025 Yes      Problems Resolved During this Admission:        Discharged Condition: good    Disposition: Home or Self Care    Follow Up:   Follow-up Information       Mayra Arcos MD Follow up on 2/7/2025.    Specialty: Obstetrics and Gynecology  Why: Postpartum check, BP check  Contact information:  [address]  [place] [id number] [phone]                           Patient Instructions:      Diet Adult Regular     Pelvic Rest     Notify your health care provider if you experience any of the following:  temperature >100.4     Notify your health care provider if you experience any of the following:  persistent nausea and vomiting or diarrhea     Notify your health care provider if you experience any of the following:  severe uncontrolled pain     Notify your health care provider if you experience any of the following:  difficulty breathing or increased cough     Notify your health care provider if you experience any of the following:  severe persistent headache     Notify your health care provider if you experience any of the following:  worsening rash     Notify your health care provider if you experience any of the following:  persistent dizziness, light-headedness, or visual disturbances     Notify your health care provider if you experience any of the following:  increased confusion or weakness     Activity as tolerated     Medications:  Reconciled Home Medications:      Medication List        START taking these  medications      labetaloL 200 MG tablet  Commonly known as: NORMODYNE  Take 1 tablet (200 mg total) by mouth every 12 (twelve) hours.     nitrofurantoin (macrocrystal-monohydrate) 100 MG capsule  Commonly known as: MACROBID  Take 1 capsule (100 mg total) by mouth 2 (two) times daily.            CONTINUE taking these medications      ibuprofen 800 MG tablet  Commonly known as: ADVIL,MOTRIN  Take 1 tablet (800 mg total) by mouth every 6 (six) hours as needed (cramping).     ONE DAILY MULTIVITAMIN per tablet  Generic drug: multivitamin  Take 1 tablet by mouth once daily.              Mayra Arcos MD, MD  Obstetrics & Gynecology  [facility]

## 2025-02-05 NOTE — PLAN OF CARE
Assessment completed at bedside with: Mother, father and baby    Address mother and baby will discharge home to: [address], [place] [address]    History of Substance Abuse issues: No    Assistive Treatment Programs or Medications (suboxone, sobutex or buprenorphine)?   No    History of Mental Health issues: No    History of Domestic Violence: No    Family  /  [place] association:  No     SW conducted a full assessment with mother due to a consult for. No consult needed.           02/05/25 1223   OB Discharge Planning Assessment   Assessment Type Discharge Planning Assessment   Source of Information patient   Insurance Commercial   Commercial [employer] [place]   Guarantor Father   People in Home spouse   Name(s) of People in Home Sam Rick (Spouse)  [phone] (Mobile)   Number people in home 3   Relationship Status    Name of Support/Comfort Primary Source Sam Rick (Spouse)  [phone] (Mobile)   Currently Enrolled in School No   Father's Address Same   Father Currently Enrolled in School No   Father's Employer [employer]   Father's Job Title Lieutenant   Family Involvement High   Received Prenatal Care Yes   Transportation Anticipated family or friend will provide   Receive [facility] Benefits Not interested    Arrangements Self   Adoption Planned no   Infant Feeding Plan breastfeeding   Previous Breastfeeding Experience no   Does baby have crib or safe sleep space? Yes   Do you have a car seat? Yes   Has other essential care items? Clothing;Bottles;Diapers   Pediatrician Dr. Foley   Resource/Environmental Concerns none   Equipment Currently Used at Home none   DME Needed Upon Discharge  none   DCFS No indications (Indicators for Report)   Discharge Plan A Home with family   Discharge Plan B Home with family   Do you have any problems affording any of your prescribed medications? No

## 2025-02-06 LAB
BACTERIA UR CULT: ABNORMAL
OHS QRS DURATION: 82 MS
OHS QTC CALCULATION: 408 MS

## 2025-02-20 DIAGNOSIS — B37.89 CANDIDIASIS OF BREAST: Primary | ICD-10-CM

## 2025-02-20 RX ORDER — FLUCONAZOLE 150 MG/1
TABLET ORAL
Qty: 2 TABLET | Refills: 0 | Status: SHIPPED | OUTPATIENT
Start: 2025-02-20

## 2025-04-03 ENCOUNTER — OFFICE VISIT (OUTPATIENT)
Dept: FAMILY MEDICINE | Facility: CLINIC | Age: 30
End: 2025-04-03
Payer: COMMERCIAL

## 2025-04-03 VITALS
DIASTOLIC BLOOD PRESSURE: 86 MMHG | HEIGHT: 64 IN | WEIGHT: 182 LBS | BODY MASS INDEX: 31.07 KG/M2 | OXYGEN SATURATION: 97 % | HEART RATE: 71 BPM | SYSTOLIC BLOOD PRESSURE: 144 MMHG

## 2025-04-03 DIAGNOSIS — O14.93 PREECLAMPSIA, THIRD TRIMESTER: ICD-10-CM

## 2025-04-03 DIAGNOSIS — Z76.89 ENCOUNTER TO ESTABLISH CARE: Primary | ICD-10-CM

## 2025-04-03 DIAGNOSIS — I10 HYPERTENSION, UNSPECIFIED TYPE: ICD-10-CM

## 2025-04-03 RX ORDER — NIFEDIPINE 30 MG/1
30 TABLET, EXTENDED RELEASE ORAL DAILY
Qty: 30 TABLET | Refills: 11 | Status: SHIPPED | OUTPATIENT
Start: 2025-04-03 | End: 2025-04-03

## 2025-04-03 RX ORDER — NIFEDIPINE 30 MG/1
30 TABLET, EXTENDED RELEASE ORAL DAILY
Qty: 30 TABLET | Refills: 11 | Status: SHIPPED | OUTPATIENT
Start: 2025-04-03 | End: 2025-04-08

## 2025-04-03 NOTE — PATIENT INSTRUCTIONS
Arash Cole,     If you are due for any health screening(s) below please notify me so we can arrange them to be ordered and scheduled. Most healthy patients at your age complete them, but you are free to accept or refuse.     If you can't do it, I'll definitely understand. If you can, I'd certainly appreciate it!    Tests to Keep You Healthy    Cervical Cancer Screening: DUE      Your cervical cancer screening is due     Our records indicate that you may be overdue for your screening Pap smear. A Pap smear is an important health screening that can detect abnormal cells that can become cervical cancer. Cervical cancer screenings allow for early diagnosis and increase the likelihood of successful treatment.     The current recommendation for Pap smear screening is every 3-5 years for women at average risk. We encourage you to schedule your appointment with your [facility] health provider. Many women see a gynecologist for this screening, but some primary care providers also provide Pap screening.     If you recently had your Pap smear screening performed outside of [facility], please let your health care team know so that they can update your health record.      Lets manage your high blood pressure     Your blood pressure was above 140/90 today during your visit. We recommend that you schedule a nurse visit in two weeks to check your blood pressure and discuss ways to support your health goals.     You can also manage your health and record your blood pressure from the comfort of home by keeping a daily blood pressure log. These results are shared with and reviewed by your provider. Please print this form (Daily Blood Pressure Log) to assist you in keeping track of your blood pressure at home.     Schedule your nurse visit in two weeks to learn more about how to track and manage high blood pressure.    Daily Blood Pressure Log    Name:__________________________________                  Date of  Birth:_________    Average Blood Pressure:  __________      Date: Time  (a.m.) Blood  Pressure: Pulse  Rate: Time  (p.m.) Blood  Pressure : Pulse  Rate:   Sample 8:37 127/83 84                                                                                                                                                                                      Goal is to be less than 130/80    Call me if you are still running above 140/90    Call me over the weekend if you have any concerns- [phone]

## 2025-04-04 LAB
ALBUMIN SERPL-MCNC: 4.8 G/DL (ref 3.6–5.1)
ALBUMIN/CREAT UR: 38 MG/G CREAT
ALBUMIN/GLOB SERPL: 1.8 (CALC) (ref 1–2.5)
ALP SERPL-CCNC: 73 U/L (ref 31–125)
ALT SERPL-CCNC: 53 U/L (ref 6–29)
AST SERPL-CCNC: 37 U/L (ref 10–30)
BASOPHILS # BLD AUTO: 38 CELLS/UL (ref 0–200)
BASOPHILS NFR BLD AUTO: 0.5 %
BILIRUB SERPL-MCNC: 0.3 MG/DL (ref 0.2–1.2)
BUN SERPL-MCNC: 11 MG/DL (ref 7–25)
BUN/CREAT SERPL: ABNORMAL (CALC) (ref 6–22)
CALCIUM SERPL-MCNC: 10.2 MG/DL (ref 8.6–10.2)
CHLORIDE SERPL-SCNC: 103 MMOL/L (ref 98–110)
CO2 SERPL-SCNC: 27 MMOL/L (ref 20–32)
CREAT SERPL-MCNC: 0.66 MG/DL (ref 0.5–0.97)
CREAT UR-MCNC: 13 MG/DL (ref 20–275)
EGFR: 120 ML/MIN/1.73M2
EOSINOPHIL # BLD AUTO: 220 CELLS/UL (ref 15–500)
EOSINOPHIL NFR BLD AUTO: 2.9 %
ERYTHROCYTE [DISTWIDTH] IN BLOOD BY AUTOMATED COUNT: 12.9 % (ref 11–15)
GLOBULIN SER CALC-MCNC: 2.7 G/DL (CALC) (ref 1.9–3.7)
GLUCOSE SERPL-MCNC: 88 MG/DL (ref 65–99)
HCT VFR BLD AUTO: 40.7 % (ref 35–45)
HGB BLD-MCNC: 13.3 G/DL (ref 11.7–15.5)
LYMPHOCYTES # BLD AUTO: 3443 CELLS/UL (ref 850–3900)
LYMPHOCYTES NFR BLD AUTO: 45.3 %
MCH RBC QN AUTO: 29.8 PG (ref 27–33)
MCHC RBC AUTO-ENTMCNC: 32.7 G/DL (ref 32–36)
MCV RBC AUTO: 91.3 FL (ref 80–100)
MICROALBUMIN UR-MCNC: 0.5 MG/DL
MONOCYTES # BLD AUTO: 509 CELLS/UL (ref 200–950)
MONOCYTES NFR BLD AUTO: 6.7 %
NEUTROPHILS # BLD AUTO: 3390 CELLS/UL (ref 1500–7800)
NEUTROPHILS NFR BLD AUTO: 44.6 %
PLATELET # BLD AUTO: 328 THOUSAND/UL (ref 140–400)
PMV BLD REES-ECKER: 9.5 FL (ref 7.5–12.5)
POTASSIUM SERPL-SCNC: 4.4 MMOL/L (ref 3.5–5.3)
PROT SERPL-MCNC: 7.5 G/DL (ref 6.1–8.1)
RBC # BLD AUTO: 4.46 MILLION/UL (ref 3.8–5.1)
SODIUM SERPL-SCNC: 140 MMOL/L (ref 135–146)
TSH SERPL-ACNC: 1.17 MIU/L
WBC # BLD AUTO: 7.6 THOUSAND/UL (ref 3.8–10.8)

## 2025-04-06 ENCOUNTER — RESULTS FOLLOW-UP (OUTPATIENT)
Dept: FAMILY MEDICINE | Facility: CLINIC | Age: 30
End: 2025-04-06
Payer: COMMERCIAL

## 2025-04-06 DIAGNOSIS — I10 HYPERTENSION, UNSPECIFIED TYPE: ICD-10-CM

## 2025-04-06 NOTE — PROGRESS NOTES
Tiny mild elevated of liver enzymes and very small amount of protein in urine, probably lingering from pregnancy. Nothing serious or concerning. Check on blood pressure numbers.

## 2025-04-07 NOTE — PROGRESS NOTES
"  SUBJECTIVE:    Patient ID: Kelsey Rick is a 29 y.o. female.    Chief Complaint: Establish Care and Discuss Elevated BP    HPI  History of Present Illness    CHIEF COMPLAINT:  Kelsey presents today for follow up of postpartum hypertension    HYPERTENSION:  She recently visited the ER with severe hypertension (181/111) leading to hospital admission. Prior to the ER visit, her home blood pressure readings were approximately 150/100. She currently takes Labetalol 100mg twice daily, increased from 100mg daily, and denies side effects. She performs home blood pressure monitoring with a manual cuff, though occasionally needs family assistance with measurements.    CURRENT SYMPTOMS:  She reports improved but occasional severe headaches attributed to caffeine intake. She describes a persistent fuzzy sensation and feeling "off," which was present during the visit and earlier while feeding her child.    OBSTETRIC HISTORY:  She has had two pregnancies, both complicated by hypertension requiring early induction. Current pregnancy resulted in delivery at 37 weeks due to elevated blood pressure, complicated by postpartum preeclampsia requiring hospital readmission and magnesium sulfate treatment. Previous pregnancy was delivered at 38 weeks due to hypertension, which normalized postpartum.    CURRENT MEDICATIONS:  She takes prenatal vitamins while breastfeeding and occasional ibuprofen 800mg for headaches, though denies frequent use since hospital discharge.    FAMILY HISTORY:  Her maternal grandfather  young from DVT. Grandmother had early-stage breast cancer treated successfully with lumpectomy.    SURGICAL HISTORY:  History of tonsillectomy and wisdom teeth extraction.    SOCIAL HISTORY:  She is currently breastfeeding and reports improvement compared to her previous experience.      ROS:  General: -fever, -chills, -fatigue, -weight gain, -weight loss, +confusion or disorientation  Eyes: -vision changes, -redness, " -discharge  ENT: -ear pain, -nasal congestion, -sore throat  Cardiovascular: -chest pain, -palpitations, -lower extremity edema  Respiratory: -cough, -shortness of breath  Gastrointestinal: -abdominal pain, -nausea, -vomiting, -diarrhea, -constipation, -blood in stool  Genitourinary: -dysuria, -hematuria, -frequency  Musculoskeletal: -joint pain, -muscle pain, +back pain  Skin: -rash, -lesion  Neurological: +headache, -dizziness, -numbness, -tingling  Psychiatric: -anxiety, -depression, -sleep difficulty         Office Visit on 04/03/2025   Component Date Value Ref Range Status    WBC 04/03/2025 7.6  3.8 - 10.8 Thousand/uL Final    RBC 04/03/2025 4.46  3.80 - 5.10 Million/uL Final    Hemoglobin 04/03/2025 13.3  11.7 - 15.5 g/dL Final    Hematocrit 04/03/2025 40.7  35.0 - 45.0 % Final    MCV 04/03/2025 91.3  80.0 - 100.0 fL Final    MCH 04/03/2025 29.8  27.0 - 33.0 pg Final    MCHC 04/03/2025 32.7  32.0 - 36.0 g/dL Final    RDW 04/03/2025 12.9  11.0 - 15.0 % Final    Platelets 04/03/2025 328  140 - 400 Thousand/uL Final    MPV 04/03/2025 9.5  7.5 - 12.5 fL Final    Neutrophils, Abs 04/03/2025 3,390  1,500 - 7,800 cells/uL Final    Lymph # 04/03/2025 3,443  850 - 3,900 cells/uL Final    Mono # 04/03/2025 509  200 - 950 cells/uL Final    Eos # 04/03/2025 220  15 - 500 cells/uL Final    Baso # 04/03/2025 38  0 - 200 cells/uL Final    Neutrophils Relative 04/03/2025 44.6  % Final    Lymph % 04/03/2025 45.3  % Final    Mono % 04/03/2025 6.7  % Final    Eosinophil % 04/03/2025 2.9  % Final    Basophil % 04/03/2025 0.5  % Final    Glucose 04/03/2025 88  65 - 99 mg/dL Final    BUN 04/03/2025 11  7 - 25 mg/dL Final    Creatinine 04/03/2025 0.66  0.50 - 0.97 mg/dL Final    eGFR 04/03/2025 120  > OR = 60 mL/min/1.73m2 Final    BUN/Creatinine Ratio 04/03/2025 SEE NOTE:  6 - 22 (calc) Final    Sodium 04/03/2025 140  135 - 146 mmol/L Final    Potassium 04/03/2025 4.4  3.5 - 5.3 mmol/L Final    Chloride 04/03/2025 103  98 - 110  mmol/L Final    CO2 04/03/2025 27  20 - 32 mmol/L Final    Calcium 04/03/2025 10.2  8.6 - 10.2 mg/dL Final    Total Protein 04/03/2025 7.5  6.1 - 8.1 g/dL Final    Albumin 04/03/2025 4.8  3.6 - 5.1 g/dL Final    Globulin, Total 04/03/2025 2.7  1.9 - 3.7 g/dL (calc) Final    Albumin/Globulin Ratio 04/03/2025 1.8  1.0 - 2.5 (calc) Final    Total Bilirubin 04/03/2025 0.3  0.2 - 1.2 mg/dL Final    Alkaline Phosphatase 04/03/2025 73  31 - 125 U/L Final    AST 04/03/2025 37 (H)  10 - 30 U/L Final    ALT 04/03/2025 53 (H)  6 - 29 U/L Final    Creatinine, Urine 04/03/2025 13 (L)  20 - 275 mg/dL Final    Microalb, Ur 04/03/2025 0.5  See Note: mg/dL Final    Microalb/Creat Ratio 04/03/2025 38 (H)  <30 mg/g creat Final    TSH w/reflex to FT4 04/03/2025 1.17  mIU/L Final   Admission on 02/04/2025, Discharged on 02/05/2025   Component Date Value Ref Range Status    Hepatitis C Ab 02/04/2025 Negative  Negative Final    QRS Duration 02/04/2025 82  ms Final    [facility] QTC Calculation 02/04/2025 408  ms Final    WBC 02/04/2025 9.46  3.90 - 12.70 K/uL Final    RBC 02/04/2025 4.26  4.00 - 5.40 M/uL Final    Hemoglobin 02/04/2025 12.9  12.0 - 16.0 g/dL Final    Hematocrit 02/04/2025 38.5  37.0 - 48.5 % Final    MCV 02/04/2025 90  82 - 98 fL Final    MCH 02/04/2025 30.3  27.0 - 31.0 pg Final    MCHC 02/04/2025 33.5  32.0 - 36.0 g/dL Final    RDW 02/04/2025 13.2  11.5 - 14.5 % Final    Platelets 02/04/2025 326  150 - 450 K/uL Final    MPV 02/04/2025 9.0 (L)  9.2 - 12.9 fL Final    Immature Granulocytes 02/04/2025 0.6 (H)  0.0 - 0.5 % Final    Gran # (ANC) 02/04/2025 5.7  1.8 - 7.7 K/uL Final    Immature Grans (Abs) 02/04/2025 0.06 (H)  0.00 - 0.04 K/uL Final    Lymph # 02/04/2025 2.8  1.0 - 4.8 K/uL Final    Mono # 02/04/2025 0.6  0.3 - 1.0 K/uL Final    Eos # 02/04/2025 0.3  0.0 - 0.5 K/uL Final    Baso # 02/04/2025 0.05  0.00 - 0.20 K/uL Final    nRBC 02/04/2025 0  0 /100 WBC Final    Gran % 02/04/2025 60.5  38.0 - 73.0 % Final    Lymph %  02/04/2025 29.4  18.0 - 48.0 % Final    Mono % 02/04/2025 6.3  4.0 - 15.0 % Final    Eosinophil % 02/04/2025 2.7  0.0 - 8.0 % Final    Basophil % 02/04/2025 0.5  0.0 - 1.9 % Final    Platelet Estimate 02/04/2025 Appears normal   Final    Differential Method 02/04/2025 Automated   Final    Sodium 02/04/2025 140  136 - 145 mmol/L Final    Potassium 02/04/2025 3.4 (L)  3.5 - 5.1 mmol/L Final    Chloride 02/04/2025 105  95 - 110 mmol/L Final    CO2 02/04/2025 28  23 - 29 mmol/L Final    Glucose 02/04/2025 92  70 - 110 mg/dL Final    BUN 02/04/2025 11  6 - 20 mg/dL Final    Creatinine 02/04/2025 0.6  0.5 - 1.4 mg/dL Final    Calcium 02/04/2025 9.7  8.7 - 10.5 mg/dL Final    Total Protein 02/04/2025 7.0  6.0 - 8.4 g/dL Final    Albumin 02/04/2025 3.8  3.5 - 5.2 g/dL Final    Total Bilirubin 02/04/2025 0.3  0.1 - 1.0 mg/dL Final    Alkaline Phosphatase 02/04/2025 93  55 - 135 U/L Final    AST 02/04/2025 22  10 - 40 U/L Final    ALT 02/04/2025 20  10 - 44 U/L Final    eGFR 02/04/2025 >60.0  >60 mL/min/1.73 m^2 Final    Anion Gap 02/04/2025 7 (L)  8 - 16 mmol/L Final    BNP 02/04/2025 94  0 - 99 pg/mL Final    Troponin I High Sensitivity 02/04/2025 5.1  0.0 - 14.9 pg/mL Final    Specimen UA 02/04/2025 Urine, Clean Catch   Final    Color, UA 02/04/2025 Brown (A)  Yellow, Straw, Blossom Final    Appearance, UA 02/04/2025 Hazy (A)  Clear Final    pH, UA 02/04/2025 8.0  5.0 - 8.0 Final    Specific Gravity, UA 02/04/2025 1.005  1.005 - 1.030 Final    Protein, UA 02/04/2025 1+ (A)  Negative Final    Glucose, UA 02/04/2025 Negative  Negative Final    Ketones, UA 02/04/2025 Negative  Negative Final    Bilirubin (UA) 02/04/2025 Negative  Negative Final    Occult Blood UA 02/04/2025 3+ (A)  Negative Final    Nitrite, UA 02/04/2025 Negative  Negative Final    Urobilinogen, UA 02/04/2025 Negative  Negative EU/dL Final    Leukocytes, UA 02/04/2025 3+ (A)  Negative Final    LD 02/04/2025 213  110 - 260 U/L Final    Magnesium 02/04/2025 1.9   1.6 - 2.6 mg/dL Final    Uric Acid 02/04/2025 4.5  2.4 - 5.7 mg/dL Final    RBC, UA 02/04/2025 57 (H)  0 - 4 /hpf Final    WBC, UA 02/04/2025 56 (H)  0 - 5 /hpf Final    WBC Clumps, UA 02/04/2025 Few (A)  None-Rare Final    Bacteria 02/04/2025 Rare  None-Occ /hpf Final    Squam Epithel, UA 02/04/2025 11  /hpf Final    Hyaline Casts, UA 02/04/2025 0  0-1/lpf /lpf Final    Ca Oxalate Stacey, UA 02/04/2025 Occasional  None-Moderate Final    Microscopic Comment 02/04/2025 SEE COMMENT   Final    Urine Culture, Routine 02/04/2025  (A)   Final                    Value:ESCHERICHIA COLI  10,000 - 49,999 cfu/ml      Protein, Urine Random 02/04/2025 80 (H)  6 - 15 mg/dL Final    Creatinine, Urine 02/04/2025 19.6  15.0 - 325.0 mg/dL Final    Prot/Creat Ratio, Urine 02/04/2025 4.08 (H)  0.00 - 0.20 Final   Admission on 01/28/2025, Discharged on 02/01/2025   Component Date Value Ref Range Status    OB Glucose Screen 11/05/2024 115  mg/dL Final    Antibody Screen 11/05/2024 negative   Final    Hepatitis B Surface Ag 06/19/2024 Negative   Final    Hepatitis B Surface Ag 11/05/2024 Negative   Final    HIV 1/2 Ag/Ab 06/19/2024 non reactive   Final    HIV 1/2 Ag/Ab 11/05/2024 non reactive   Final    RPR 06/19/2024 non reactive   Final    RPR 11/05/2024 non reactive   Final    Rubella Immune Status 06/19/2024 immune   Final    Strep B Culture 01/16/2025 positive   Final    Gluc Fast 11/05/2024 80  mg/dL Final    N gonorrhoeae, amplified DNA 06/19/2024 negative   Final    Chlamydia, Amplified DNA 06/19/2024 negative   Final    Hep C Virus Ab Signal/Cutoff 06/19/2024 non reactive   Final    Specimen UA 01/28/2025 Urine, Clean Catch   Final    Color, UA 01/28/2025 Colorless (A)  Yellow, Straw, Blossom Final    Appearance, UA 01/28/2025 Clear  Clear Final    pH, UA 01/28/2025 8.0  5.0 - 8.0 Final    Specific Gravity, UA 01/28/2025 1.005  1.005 - 1.030 Final    Protein, UA 01/28/2025 Negative  Negative Final    Glucose, UA 01/28/2025 Negative   Negative Final    Ketones, UA 01/28/2025 Negative  Negative Final    Bilirubin (UA) 01/28/2025 Negative  Negative Final    Occult Blood UA 01/28/2025 Negative  Negative Final    Nitrite, UA 01/28/2025 Negative  Negative Final    Urobilinogen, UA 01/28/2025 Negative  Negative EU/dL Final    Leukocytes, UA 01/28/2025 Negative  Negative Final    WBC 01/28/2025 11.20  3.90 - 12.70 K/uL Final    RBC 01/28/2025 3.47 (L)  4.00 - 5.40 M/uL Final    Hemoglobin 01/28/2025 10.6 (L)  12.0 - 16.0 g/dL Final    Hematocrit 01/28/2025 30.7 (L)  37.0 - 48.5 % Final    MCV 01/28/2025 89  82 - 98 fL Final    MCH 01/28/2025 30.5  27.0 - 31.0 pg Final    MCHC 01/28/2025 34.5  32.0 - 36.0 g/dL Final    RDW 01/28/2025 13.7  11.5 - 14.5 % Final    Platelets 01/28/2025 201  150 - 450 K/uL Final    MPV 01/28/2025 9.2  9.2 - 12.9 fL Final    Immature Granulocytes 01/28/2025 0.7 (H)  0.0 - 0.5 % Final    Gran # (ANC) 01/28/2025 7.4  1.8 - 7.7 K/uL Final    Immature Grans (Abs) 01/28/2025 0.08 (H)  0.00 - 0.04 K/uL Final    Lymph # 01/28/2025 2.8  1.0 - 4.8 K/uL Final    Mono # 01/28/2025 0.9  0.3 - 1.0 K/uL Final    Eos # 01/28/2025 0.1  0.0 - 0.5 K/uL Final    Baso # 01/28/2025 0.03  0.00 - 0.20 K/uL Final    nRBC 01/28/2025 0  0 /100 WBC Final    Gran % 01/28/2025 65.6  38.0 - 73.0 % Final    Lymph % 01/28/2025 24.7  18.0 - 48.0 % Final    Mono % 01/28/2025 7.7  4.0 - 15.0 % Final    Eosinophil % 01/28/2025 1.0  0.0 - 8.0 % Final    Basophil % 01/28/2025 0.3  0.0 - 1.9 % Final    Differential Method 01/28/2025 Automated   Final    Group & Rh 01/28/2025 O POS   Final    Indirect Renetta 01/28/2025 NEG   Final    Treponema Pallidum Antibodies (IgG* 01/28/2025 Nonreactive  Nonreactive Final    Benzodiazepines 01/28/2025 Negative  Negative Final    Cocaine (Metab.) 01/28/2025 Negative  Negative Final    Opiate Scrn, Ur 01/28/2025 Negative  Negative Final    Barbiturate Screen, Ur 01/28/2025 Negative  Negative Final    Amphetamine Screen, Ur  01/28/2025 Negative  Negative Final    THC 01/28/2025 Negative  Negative Final    Phencyclidine 01/28/2025 Negative  Negative Final    Creatinine, Urine 01/28/2025 31.8  15.0 - 325.0 mg/dL Final    Toxicology Information 01/28/2025 SEE COMMENT   Final    BUPRENORPHINE 01/28/2025 Negative   Final    Creatinine, Urine 01/28/2025 31.8  15.0 - 325.0 mg/dL Final    Fentanyl, Urine 01/28/2025 Negative @MARIELOS  Negative Final    Creatinine, Urine 01/28/2025 31.8  15.0 - 325.0 mg/dL Final    WBC 01/31/2025 13.31 (H)  3.90 - 12.70 K/uL Final    RBC 01/31/2025 3.29 (L)  4.00 - 5.40 M/uL Final    Hemoglobin 01/31/2025 10.2 (L)  12.0 - 16.0 g/dL Final    Hematocrit 01/31/2025 29.5 (L)  37.0 - 48.5 % Final    MCV 01/31/2025 90  82 - 98 fL Final    MCH 01/31/2025 31.0  27.0 - 31.0 pg Final    MCHC 01/31/2025 34.6  32.0 - 36.0 g/dL Final    RDW 01/31/2025 13.9  11.5 - 14.5 % Final    Platelets 01/31/2025 163  150 - 450 K/uL Final    MPV 01/31/2025 9.1 (L)  9.2 - 12.9 fL Final    Immature Granulocytes 01/31/2025 0.8 (H)  0.0 - 0.5 % Final    Gran # (ANC) 01/31/2025 8.7 (H)  1.8 - 7.7 K/uL Final    Immature Grans (Abs) 01/31/2025 0.10 (H)  0.00 - 0.04 K/uL Final    Lymph # 01/31/2025 3.5  1.0 - 4.8 K/uL Final    Mono # 01/31/2025 0.8  0.3 - 1.0 K/uL Final    Eos # 01/31/2025 0.2  0.0 - 0.5 K/uL Final    Baso # 01/31/2025 0.05  0.00 - 0.20 K/uL Final    nRBC 01/31/2025 0  0 /100 WBC Final    Gran % 01/31/2025 65.0  38.0 - 73.0 % Final    Lymph % 01/31/2025 26.2  18.0 - 48.0 % Final    Mono % 01/31/2025 5.9  4.0 - 15.0 % Final    Eosinophil % 01/31/2025 1.7  0.0 - 8.0 % Final    Basophil % 01/31/2025 0.4  0.0 - 1.9 % Final    Differential Method 01/31/2025 Automated   Final    Sodium 01/31/2025 138  136 - 145 mmol/L Final    Potassium 01/31/2025 3.2 (L)  3.5 - 5.1 mmol/L Final    Chloride 01/31/2025 108  95 - 110 mmol/L Final    CO2 01/31/2025 23  23 - 29 mmol/L Final    Glucose 01/31/2025 82  70 - 110 mg/dL Final    BUN 01/31/2025 3  "(L)  6 - 20 mg/dL Final    Creatinine 01/31/2025 0.5  0.5 - 1.4 mg/dL Final    Calcium 01/31/2025 8.2 (L)  8.7 - 10.5 mg/dL Final    Total Protein 01/31/2025 4.7 (L)  6.0 - 8.4 g/dL Final    Albumin 01/31/2025 2.7 (L)  3.5 - 5.2 g/dL Final    Total Bilirubin 01/31/2025 0.2  0.1 - 1.0 mg/dL Final    Alkaline Phosphatase 01/31/2025 83  55 - 135 U/L Final    AST 01/31/2025 17  10 - 40 U/L Final    ALT 01/31/2025 8 (L)  10 - 44 U/L Final    eGFR 01/31/2025 >60.0  >60 mL/min/1.73 m^2 Final    Anion Gap 01/31/2025 7 (L)  8 - 16 mmol/L Final       History reviewed. No pertinent past medical history.  Social History[1]  Past Surgical History:   Procedure Laterality Date    TONSILLECTOMY      WISDOM TOOTH EXTRACTION       No family history on file.    The CVD Risk score (D'Agostino, et al., 2008) failed to calculate for the following reasons:    Cannot find a previous HDL lab    Cannot find a previous total cholesterol lab    Tests to Keep You Healthy    Cervical Cancer Screening: DUE      Review of patient's allergies indicates:  No Known Allergies  Current Medications[2]    Review of Systems        Objective:      Vitals:    04/03/25 1124 04/03/25 1125   BP: (!) 144/82 (!) 144/86   Pulse: 71    SpO2: 97%    Weight: 82.6 kg (182 lb)    Height: 5' 4" (1.626 m)      Physical Exam  Vitals and nursing note reviewed.   Constitutional:       General: She is not in acute distress.     Appearance: Normal appearance. She is well-developed. She is not ill-appearing.   HENT:      Head: Normocephalic and atraumatic.      Right Ear: External ear normal.      Left Ear: External ear normal.      Nose: Nose normal.      Mouth/Throat:      Lips: Pink.      Pharynx: Oropharynx is clear.   Eyes:      General: No scleral icterus.     Pupils: Pupils are equal, round, and reactive to light.   Neck:      Thyroid: No thyromegaly.      Vascular: No carotid bruit.   Cardiovascular:      Rate and Rhythm: Normal rate and regular rhythm.      Pulses:   "         Radial pulses are 2+ on the right side and 2+ on the left side.      Heart sounds: Normal heart sounds. No murmur heard.  Pulmonary:      Effort: Pulmonary effort is normal.      Breath sounds: Normal breath sounds.   Abdominal:      General: Bowel sounds are normal.      Palpations: Abdomen is soft.      Tenderness: There is no abdominal tenderness.   Musculoskeletal:         General: Normal range of motion.      Cervical back: Normal range of motion.      Lumbar back: Normal. No spasms.      Right lower leg: No edema.      Left lower leg: No edema.   Skin:     General: Skin is warm and dry.      Capillary Refill: Capillary refill takes less than 2 seconds.   Neurological:      General: No focal deficit present.      Mental Status: She is alert and oriented to person, place, and time.      Cranial Nerves: No cranial nerve deficit.      Sensory: Sensation is intact.      Motor: No weakness.      Gait: Gait is intact.      Deep Tendon Reflexes: Reflexes normal.   Psychiatric:         Attention and Perception: Attention normal.         Mood and Affect: Mood normal.         Speech: Speech normal.         Behavior: Behavior is cooperative.         Thought Content: Thought content does not include homicidal or suicidal ideation.       Physical Exam               Assessment:       1. Encounter to establish care    2. Hypertension, unspecified type    3. Preeclampsia, third trimester    4. Preeclampsia in postpartum period         Plan:       Encounter to establish care    Hypertension, unspecified type  -     NIFEdipine (PROCARDIA-XL) 30 MG (OSM) 24 hr tablet; Take 1 tablet (30 mg total) by mouth once daily.  Dispense: 30 tablet; Refill: 11  -     CBC Auto Differential; Future; Expected date: 04/03/2025  -     Comprehensive Metabolic Panel; Future; Expected date: 04/03/2025  -     Microalbumin/Creatinine Ratio, Urine; Future; Expected date: 04/03/2025  -     TSH w/reflex to FT4; Future; Expected date:  04/03/2025    Preeclampsia, third trimester    Preeclampsia in postpartum period      Assessment & Plan        IMPRESSION:  - Assessed postpartum HTN, noting persistent elevated BP despite current labetalol regimen.  - Evaluated medication options, considering breastfeeding status and potential impact on milk production.  - Switched from labetalol to Procardia XL (nifedipine) 30 mg daily for better BP control and once-daily dosing convenience.  - Reviewed vaccine status and family history to assess overall health risks.  - Assessed for signs of preeclampsia, including checking reflexes and recent lab results.    HYPERTENSION COMPLICATING CHILDBIRTH:  - Assessed the patient's blood pressure, which remains elevated at 144/86 despite current medication regimen.  - Emphasized the importance of controlling blood pressure to prevent long-term organ damage.  - Informed about the possibility of blood pressure normalizing over time or potentially requiring long-term management.  - Instructed the patient to monitor blood pressure at home, checking at least 2 hours after taking medication, aiming for readings below 130/80.  - Discontinued labetalol and prescribed Procardia XL (nifedipine) 30 mg daily for better blood pressure control and once-daily dosing convenience.  - Advised the patient to contact the office if blood pressure readings are consistently elevated or if experiencing any concerning symptoms.  - Scheduled follow up in 3 months, or sooner if needed based on blood pressure readings.  - Provided personal cell phone number for weekend contact if any issues arise with new medication.  - Discussed the relationship between placenta and pregnancy-associated hypertension.  - Explained the risks associated with hypertensive issues in future pregnancies.    PRE-ECLAMPSIA MANAGEMENT:  - Evaluated patient's reflexes and found them normal, indicating no current signs of preeclampsia.  - Noted previous lab results showed  normal liver enzymes, LDH, uric acid, and magnesium levels.  - Assessed that the patient is no longer at risk for seizures related to preeclampsia.  - Ordered urine protein check as part of follow-up care.    POSTPARTUM COMPLICATIONS:  - Noted patient experienced yeast infection on nipples during breastfeeding.  - Educated on the importance of maintaining prenatal vitamin intake during breastfeeding.  - Advised on the safety of ibuprofen use postpartum.  - Instructed the patient to continue breastfeeding as desired.    HEADACHE MANAGEMENT:  - Noted patient reports experiencing headaches, though they have improved.  - Advised patient to take 800 mg ibuprofen for headache relief as needed.    FAMILY HISTORY OF BLOOD DISORDERS:  - Noted family history of DVT in maternal grandfather.    FAMILY HISTORY OF BREAST CANCER:  - Noted family history of breast cancer in grandmother, caught early and treated successfully.  - Assessed that patient is up to date on screenings and does not need mammogram yet.    FOLLOW-UP CARE:  - Ordered labs in  cluding complete blood count, platelet check, metabolic panel, and liver enzyme check.  - Ordered complete blood count, metabolic panel, and urinalysis for protein.         Follow up in about 3 months (around 7/3/2025), or if symptoms worsen or fail to improve, for HTN.        This note was generated with the assistance of ambient listening technology. Verbal consent was obtained by the patient and accompanying visitor(s) for the recording of patient appointment to facilitate this note. I attest to having reviewed and edited the generated note for accuracy, though some syntax or spelling errors may persist. Please contact the author of this note for any clarification.      4/8/2025 Niurka Reese         [1]   Social History  Socioeconomic History    Marital status:    Tobacco Use    Smoking status: Never    Smokeless tobacco: Never   Substance and Sexual Activity    Alcohol use:  Not Currently    Drug use: Never    Sexual activity: Yes     Partners: Male     Social Drivers of Health     Financial Resource Strain: Low Risk  (2/5/2025)    Overall Financial Resource Strain (CARDIA)     Difficulty of Paying Living Expenses: Not hard at all   Food Insecurity: No Food Insecurity (2/5/2025)    Hunger Vital Sign     Worried About Running Out of Food in the Last Year: Never true     Ran Out of Food in the Last Year: Never true   Transportation Needs: No Transportation Needs (2/5/2025)    TRANSPORTATION NEEDS     Transportation : No   Stress: No Stress Concern Present (2/5/2025)    [employer] - Occupational Stress Questionnaire     Feeling of Stress : Not at all   Housing Stability: Unknown (2/5/2025)    Housing Stability Vital Sign     Unable to Pay for Housing in the Last Year: No     Homeless in the Last Year: No   [2]   Current Outpatient Medications:     labetaloL (NORMODYNE) 200 MG tablet, Take 1 tablet (200 mg total) by mouth every 12 (twelve) hours. (Patient taking differently: Take 100 mg by mouth every 12 (twelve) hours.), Disp: 60 tablet, Rfl: 2    prenatal no115/iron/folic acid (PRENATAL 19 ORAL), Take 1 capsule by mouth Daily., Disp: , Rfl:     fluconazole (DIFLUCAN) 150 MG Tab, Take one pill today and one in one week., Disp: 2 tablet, Rfl: 0    ibuprofen (ADVIL,MOTRIN) 800 MG tablet, Take 1 tablet (800 mg total) by mouth every 6 (six) hours as needed (cramping)., Disp: 60 tablet, Rfl: 0    multivitamin (ONE DAILY MULTIVITAMIN) per tablet, Take 1 tablet by mouth once daily., Disp: , Rfl:     NIFEdipine (PROCARDIA-XL) 30 MG (OSM) 24 hr tablet, Take 1 tablet (30 mg total) by mouth once daily., Disp: 30 tablet, Rfl: 11

## 2025-04-09 RX ORDER — NIFEDIPINE 30 MG/1
60 TABLET, EXTENDED RELEASE ORAL DAILY
Start: 2025-04-09 | End: 2025-04-11

## 2025-04-11 DIAGNOSIS — I10 HYPERTENSION, UNSPECIFIED TYPE: Primary | ICD-10-CM

## 2025-04-11 RX ORDER — LABETALOL 200 MG/1
200 TABLET, FILM COATED ORAL 2 TIMES DAILY
Qty: 180 TABLET | Refills: 1 | Status: SHIPPED | OUTPATIENT
Start: 2025-04-11 | End: 2025-10-08

## 2025-04-11 NOTE — TELEPHONE ENCOUNTER
----- Message from Dayami sent at 4/11/2025  8:35 AM CDT -----  Niurka asked the patient to  called and give the nurse some B/P readings. Pt's # [phone]

## 2025-04-11 NOTE — TELEPHONE ENCOUNTER
Spoke with pt blood pressure has been about the same very inconsistent  She is not noticing a difference between the meds. Has episodes of feeling very flush and not well every now and then her milk is less fatty now. Pt is requesting going back to labetalol       Pt states its a lot of readings so she is going to send a portal message with the readings.

## 2025-07-01 ENCOUNTER — OFFICE VISIT (OUTPATIENT)
Dept: FAMILY MEDICINE | Facility: CLINIC | Age: 30
End: 2025-07-01
Payer: COMMERCIAL

## 2025-07-01 VITALS
HEIGHT: 64 IN | OXYGEN SATURATION: 99 % | HEART RATE: 76 BPM | SYSTOLIC BLOOD PRESSURE: 100 MMHG | DIASTOLIC BLOOD PRESSURE: 68 MMHG | BODY MASS INDEX: 29.02 KG/M2 | WEIGHT: 170 LBS

## 2025-07-01 DIAGNOSIS — I10 HYPERTENSION, UNSPECIFIED TYPE: Primary | ICD-10-CM

## 2025-07-01 DIAGNOSIS — F41.9 ANXIETY: ICD-10-CM

## 2025-07-01 PROCEDURE — 3078F DIAST BP <80 MM HG: CPT | Mod: CPTII,S$GLB,,

## 2025-07-01 PROCEDURE — 3008F BODY MASS INDEX DOCD: CPT | Mod: CPTII,S$GLB,,

## 2025-07-01 PROCEDURE — 3066F NEPHROPATHY DOC TX: CPT | Mod: CPTII,S$GLB,,

## 2025-07-01 PROCEDURE — 1159F MED LIST DOCD IN RCRD: CPT | Mod: CPTII,S$GLB,,

## 2025-07-01 PROCEDURE — 99214 OFFICE O/P EST MOD 30 MIN: CPT | Mod: S$GLB,,,

## 2025-07-01 PROCEDURE — 3074F SYST BP LT 130 MM HG: CPT | Mod: CPTII,S$GLB,,

## 2025-07-01 PROCEDURE — 1160F RVW MEDS BY RX/DR IN RCRD: CPT | Mod: CPTII,S$GLB,,

## 2025-07-01 PROCEDURE — 3060F POS MICROALBUMINURIA REV: CPT | Mod: CPTII,S$GLB,,

## 2025-07-01 RX ORDER — FLUORIDE (SODIUM) 1.1 %
PASTE (ML) DENTAL NIGHTLY
COMMUNITY
Start: 2025-05-01

## 2025-07-01 RX ORDER — SERTRALINE HYDROCHLORIDE 25 MG/1
TABLET, FILM COATED ORAL
Qty: 30 TABLET | Refills: 11 | Status: SHIPPED | OUTPATIENT
Start: 2025-07-01

## 2025-07-01 NOTE — PATIENT INSTRUCTIONS
Cut labetalol in half and try only 100mg twice a day and check blood pressure for a week, let me know your readings.  And let me know in a week how you feel on the sertraline

## 2025-07-02 NOTE — PROGRESS NOTES
SUBJECTIVE:    Patient ID: Kelsey Rick is a 30 y.o. female.    Chief Complaint: Follow-up (No bottles//Pt is here for a 3 month follow up//KE)    HPI  History of Present Illness    CHIEF COMPLAINT:  Kelsey presents today for postpartum follow-up    ANXIETY:  She reports significant anxiety and rage symptoms, notably exacerbated during her 's rotating night shift schedule every two weeks. She experiences overstimulation and heightened emotional reactivity, particularly impacting family dynamics and interactions with children. She acknowledges symptoms are historically stress-driven and primarily manifest as anxiety, rage, and feeling overwhelmed during periods of increased stress and disrupted family schedules. She denies current depressive symptoms, suicidal ideation, or intent to harm others, and emphasizes symptoms have not involved aggression towards her .    MENTAL HEALTH HISTORY:  She has a previous history of Zoloft use during college, recalling weight gain as a side effect. She expresses initial hesitation about mental health treatment due to historical stigma, but currently demonstrates openness to addressing her mental health needs.    BREASTFEEDING:  She is currently breastfeeding with good milk supply. Her period has returned, which caused a slight dip in milk production, but she does not require formula supplementation. She continues successful breastfeeding without complications such as mastitis or breast pain.    CURRENT MEDICATIONS:  She takes Labetalol 200 mg twice daily. She previously tried nifedipine but did not tolerate the medication and was switched back to labetalol.      ROS:  General: -fever, -chills, -fatigue, -weight gain, -weight loss  Eyes: -vision changes, -redness, -discharge  ENT: -ear pain, -nasal congestion, -sore throat  Cardiovascular: -chest pain, -palpitations, -lower extremity edema  Respiratory: -cough, -shortness of breath  Gastrointestinal: -abdominal  pain, -nausea, -vomiting, -diarrhea, -constipation, -blood in stool  Genitourinary: -dysuria, -hematuria, -frequency  Musculoskeletal: -joint pain, -muscle pain  Skin: -rash, -lesion  Neurological: -headache, -dizziness, -numbness, -tingling  Psychiatric: +anxiety, -depression, -sleep difficulty, +anger problems         Office Visit on 04/03/2025   Component Date Value Ref Range Status    WBC 04/03/2025 7.6  3.8 - 10.8 Thousand/uL Final    RBC 04/03/2025 4.46  3.80 - 5.10 Million/uL Final    Hemoglobin 04/03/2025 13.3  11.7 - 15.5 g/dL Final    Hematocrit 04/03/2025 40.7  35.0 - 45.0 % Final    MCV 04/03/2025 91.3  80.0 - 100.0 fL Final    MCH 04/03/2025 29.8  27.0 - 33.0 pg Final    MCHC 04/03/2025 32.7  32.0 - 36.0 g/dL Final    RDW 04/03/2025 12.9  11.0 - 15.0 % Final    Platelets 04/03/2025 328  140 - 400 Thousand/uL Final    MPV 04/03/2025 9.5  7.5 - 12.5 fL Final    Neutrophils, Abs 04/03/2025 3,390  1,500 - 7,800 cells/uL Final    Lymph # 04/03/2025 3,443  850 - 3,900 cells/uL Final    Mono # 04/03/2025 509  200 - 950 cells/uL Final    Eos # 04/03/2025 220  15 - 500 cells/uL Final    Baso # 04/03/2025 38  0 - 200 cells/uL Final    Neutrophils Relative 04/03/2025 44.6  % Final    Lymph % 04/03/2025 45.3  % Final    Mono % 04/03/2025 6.7  % Final    Eosinophil % 04/03/2025 2.9  % Final    Basophil % 04/03/2025 0.5  % Final    Glucose 04/03/2025 88  65 - 99 mg/dL Final    BUN 04/03/2025 11  7 - 25 mg/dL Final    Creatinine 04/03/2025 0.66  0.50 - 0.97 mg/dL Final    eGFR 04/03/2025 120  > OR = 60 mL/min/1.73m2 Final    BUN/Creatinine Ratio 04/03/2025 SEE NOTE:  6 - 22 (calc) Final    Sodium 04/03/2025 140  135 - 146 mmol/L Final    Potassium 04/03/2025 4.4  3.5 - 5.3 mmol/L Final    Chloride 04/03/2025 103  98 - 110 mmol/L Final    CO2 04/03/2025 27  20 - 32 mmol/L Final    Calcium 04/03/2025 10.2  8.6 - 10.2 mg/dL Final    Total Protein 04/03/2025 7.5  6.1 - 8.1 g/dL Final    Albumin 04/03/2025 4.8  3.6 - 5.1  g/dL Final    Globulin, Total 04/03/2025 2.7  1.9 - 3.7 g/dL (calc) Final    Albumin/Globulin Ratio 04/03/2025 1.8  1.0 - 2.5 (calc) Final    Total Bilirubin 04/03/2025 0.3  0.2 - 1.2 mg/dL Final    Alkaline Phosphatase 04/03/2025 73  31 - 125 U/L Final    AST 04/03/2025 37 (H)  10 - 30 U/L Final    ALT 04/03/2025 53 (H)  6 - 29 U/L Final    Creatinine, Urine 04/03/2025 13 (L)  20 - 275 mg/dL Final    Microalb, Ur 04/03/2025 0.5  See Note: mg/dL Final    Microalb/Creat Ratio 04/03/2025 38 (H)  <30 mg/g creat Final    TSH w/reflex to FT4 04/03/2025 1.17  mIU/L Final   Admission on 02/04/2025, Discharged on 02/05/2025   Component Date Value Ref Range Status    Hepatitis C Ab 02/04/2025 Negative  Negative Final    QRS Duration 02/04/2025 82  ms Final    [facility] QTC Calculation 02/04/2025 408  ms Final    WBC 02/04/2025 9.46  3.90 - 12.70 K/uL Final    RBC 02/04/2025 4.26  4.00 - 5.40 M/uL Final    Hemoglobin 02/04/2025 12.9  12.0 - 16.0 g/dL Final    Hematocrit 02/04/2025 38.5  37.0 - 48.5 % Final    MCV 02/04/2025 90  82 - 98 fL Final    MCH 02/04/2025 30.3  27.0 - 31.0 pg Final    MCHC 02/04/2025 33.5  32.0 - 36.0 g/dL Final    RDW 02/04/2025 13.2  11.5 - 14.5 % Final    Platelets 02/04/2025 326  150 - 450 K/uL Final    MPV 02/04/2025 9.0 (L)  9.2 - 12.9 fL Final    Immature Granulocytes 02/04/2025 0.6 (H)  0.0 - 0.5 % Final    Gran # (ANC) 02/04/2025 5.7  1.8 - 7.7 K/uL Final    Immature Grans (Abs) 02/04/2025 0.06 (H)  0.00 - 0.04 K/uL Final    Lymph # 02/04/2025 2.8  1.0 - 4.8 K/uL Final    Mono # 02/04/2025 0.6  0.3 - 1.0 K/uL Final    Eos # 02/04/2025 0.3  0.0 - 0.5 K/uL Final    Baso # 02/04/2025 0.05  0.00 - 0.20 K/uL Final    nRBC 02/04/2025 0  0 /100 WBC Final    Gran % 02/04/2025 60.5  38.0 - 73.0 % Final    Lymph % 02/04/2025 29.4  18.0 - 48.0 % Final    Mono % 02/04/2025 6.3  4.0 - 15.0 % Final    Eosinophil % 02/04/2025 2.7  0.0 - 8.0 % Final    Basophil % 02/04/2025 0.5  0.0 - 1.9 % Final    Platelet  Estimate 02/04/2025 Appears normal   Final    Differential Method 02/04/2025 Automated   Final    Sodium 02/04/2025 140  136 - 145 mmol/L Final    Potassium 02/04/2025 3.4 (L)  3.5 - 5.1 mmol/L Final    Chloride 02/04/2025 105  95 - 110 mmol/L Final    CO2 02/04/2025 28  23 - 29 mmol/L Final    Glucose 02/04/2025 92  70 - 110 mg/dL Final    BUN 02/04/2025 11  6 - 20 mg/dL Final    Creatinine 02/04/2025 0.6  0.5 - 1.4 mg/dL Final    Calcium 02/04/2025 9.7  8.7 - 10.5 mg/dL Final    Total Protein 02/04/2025 7.0  6.0 - 8.4 g/dL Final    Albumin 02/04/2025 3.8  3.5 - 5.2 g/dL Final    Total Bilirubin 02/04/2025 0.3  0.1 - 1.0 mg/dL Final    Alkaline Phosphatase 02/04/2025 93  55 - 135 U/L Final    AST 02/04/2025 22  10 - 40 U/L Final    ALT 02/04/2025 20  10 - 44 U/L Final    eGFR 02/04/2025 >60.0  >60 mL/min/1.73 m^2 Final    Anion Gap 02/04/2025 7 (L)  8 - 16 mmol/L Final    BNP 02/04/2025 94  0 - 99 pg/mL Final    Troponin I High Sensitivity 02/04/2025 5.1  0.0 - 14.9 pg/mL Final    Specimen UA 02/04/2025 Urine, Clean Catch   Final    Color, UA 02/04/2025 Brown (A)  Yellow, Straw, Blossom Final    Appearance, UA 02/04/2025 Hazy (A)  Clear Final    pH, UA 02/04/2025 8.0  5.0 - 8.0 Final    Specific Gravity, UA 02/04/2025 1.005  1.005 - 1.030 Final    Protein, UA 02/04/2025 1+ (A)  Negative Final    Glucose, UA 02/04/2025 Negative  Negative Final    Ketones, UA 02/04/2025 Negative  Negative Final    Bilirubin (UA) 02/04/2025 Negative  Negative Final    Occult Blood UA 02/04/2025 3+ (A)  Negative Final    Nitrite, UA 02/04/2025 Negative  Negative Final    Urobilinogen, UA 02/04/2025 Negative  Negative EU/dL Final    Leukocytes, UA 02/04/2025 3+ (A)  Negative Final    LD 02/04/2025 213  110 - 260 U/L Final    Magnesium 02/04/2025 1.9  1.6 - 2.6 mg/dL Final    Uric Acid 02/04/2025 4.5  2.4 - 5.7 mg/dL Final    RBC, UA 02/04/2025 57 (H)  0 - 4 /hpf Final    WBC, UA 02/04/2025 56 (H)  0 - 5 /hpf Final    WBC Clumps, UA  02/04/2025 Few (A)  None-Rare Final    Bacteria 02/04/2025 Rare  None-Occ /hpf Final    Squam Epithel, UA 02/04/2025 11  /hpf Final    Hyaline Casts, UA 02/04/2025 0  0-1/lpf /lpf Final    Ca Oxalate Stacey, UA 02/04/2025 Occasional  None-Moderate Final    Microscopic Comment 02/04/2025 SEE COMMENT   Final    Urine Culture, Routine 02/04/2025  (A)   Final                    Value:ESCHERICHIA COLI  10,000 - 49,999 cfu/ml      Protein, Urine Random 02/04/2025 80 (H)  6 - 15 mg/dL Final    Creatinine, Urine 02/04/2025 19.6  15.0 - 325.0 mg/dL Final    Prot/Creat Ratio, Urine 02/04/2025 4.08 (H)  0.00 - 0.20 Final   Admission on 01/28/2025, Discharged on 02/01/2025   Component Date Value Ref Range Status    OB Glucose Screen 11/05/2024 115  mg/dL Final    Antibody Screen 11/05/2024 negative   Final    Hepatitis B Surface Ag 06/19/2024 Negative   Final    Hepatitis B Surface Ag 11/05/2024 Negative   Final    HIV 1/2 Ag/Ab 06/19/2024 non reactive   Final    HIV 1/2 Ag/Ab 11/05/2024 non reactive   Final    RPR 06/19/2024 non reactive   Final    RPR 11/05/2024 non reactive   Final    Rubella Immune Status 06/19/2024 immune   Final    Strep B Culture 01/16/2025 positive   Final    Gluc Fast 11/05/2024 80  mg/dL Final    N gonorrhoeae, amplified DNA 06/19/2024 negative   Final    Chlamydia, Amplified DNA 06/19/2024 negative   Final    Hep C Virus Ab Signal/Cutoff 06/19/2024 non reactive   Final    Specimen UA 01/28/2025 Urine, Clean Catch   Final    Color, UA 01/28/2025 Colorless (A)  Yellow, Straw, Blossom Final    Appearance, UA 01/28/2025 Clear  Clear Final    pH, UA 01/28/2025 8.0  5.0 - 8.0 Final    Specific Gravity, UA 01/28/2025 1.005  1.005 - 1.030 Final    Protein, UA 01/28/2025 Negative  Negative Final    Glucose, UA 01/28/2025 Negative  Negative Final    Ketones, UA 01/28/2025 Negative  Negative Final    Bilirubin (UA) 01/28/2025 Negative  Negative Final    Occult Blood UA 01/28/2025 Negative  Negative Final     Nitrite, UA 01/28/2025 Negative  Negative Final    Urobilinogen, UA 01/28/2025 Negative  Negative EU/dL Final    Leukocytes, UA 01/28/2025 Negative  Negative Final    WBC 01/28/2025 11.20  3.90 - 12.70 K/uL Final    RBC 01/28/2025 3.47 (L)  4.00 - 5.40 M/uL Final    Hemoglobin 01/28/2025 10.6 (L)  12.0 - 16.0 g/dL Final    Hematocrit 01/28/2025 30.7 (L)  37.0 - 48.5 % Final    MCV 01/28/2025 89  82 - 98 fL Final    MCH 01/28/2025 30.5  27.0 - 31.0 pg Final    MCHC 01/28/2025 34.5  32.0 - 36.0 g/dL Final    RDW 01/28/2025 13.7  11.5 - 14.5 % Final    Platelets 01/28/2025 201  150 - 450 K/uL Final    MPV 01/28/2025 9.2  9.2 - 12.9 fL Final    Immature Granulocytes 01/28/2025 0.7 (H)  0.0 - 0.5 % Final    Gran # (ANC) 01/28/2025 7.4  1.8 - 7.7 K/uL Final    Immature Grans (Abs) 01/28/2025 0.08 (H)  0.00 - 0.04 K/uL Final    Lymph # 01/28/2025 2.8  1.0 - 4.8 K/uL Final    Mono # 01/28/2025 0.9  0.3 - 1.0 K/uL Final    Eos # 01/28/2025 0.1  0.0 - 0.5 K/uL Final    Baso # 01/28/2025 0.03  0.00 - 0.20 K/uL Final    nRBC 01/28/2025 0  0 /100 WBC Final    Gran % 01/28/2025 65.6  38.0 - 73.0 % Final    Lymph % 01/28/2025 24.7  18.0 - 48.0 % Final    Mono % 01/28/2025 7.7  4.0 - 15.0 % Final    Eosinophil % 01/28/2025 1.0  0.0 - 8.0 % Final    Basophil % 01/28/2025 0.3  0.0 - 1.9 % Final    Differential Method 01/28/2025 Automated   Final    Group & Rh 01/28/2025 O POS   Final    Indirect Renetta 01/28/2025 NEG   Final    Treponema Pallidum Antibodies (IgG* 01/28/2025 Nonreactive  Nonreactive Final    Benzodiazepines 01/28/2025 Negative  Negative Final    Cocaine (Metab.) 01/28/2025 Negative  Negative Final    Opiate Scrn, Ur 01/28/2025 Negative  Negative Final    Barbiturate Screen, Ur 01/28/2025 Negative  Negative Final    Amphetamine Screen, Ur 01/28/2025 Negative  Negative Final    THC 01/28/2025 Negative  Negative Final    Phencyclidine 01/28/2025 Negative  Negative Final    Creatinine, Urine 01/28/2025 31.8  15.0 - 325.0  mg/dL Final    Toxicology Information 01/28/2025 SEE COMMENT   Final    BUPRENORPHINE 01/28/2025 Negative   Final    Creatinine, Urine 01/28/2025 31.8  15.0 - 325.0 mg/dL Final    Fentanyl, Urine 01/28/2025 Negative @MARIELOS  Negative Final    Creatinine, Urine 01/28/2025 31.8  15.0 - 325.0 mg/dL Final    WBC 01/31/2025 13.31 (H)  3.90 - 12.70 K/uL Final    RBC 01/31/2025 3.29 (L)  4.00 - 5.40 M/uL Final    Hemoglobin 01/31/2025 10.2 (L)  12.0 - 16.0 g/dL Final    Hematocrit 01/31/2025 29.5 (L)  37.0 - 48.5 % Final    MCV 01/31/2025 90  82 - 98 fL Final    MCH 01/31/2025 31.0  27.0 - 31.0 pg Final    MCHC 01/31/2025 34.6  32.0 - 36.0 g/dL Final    RDW 01/31/2025 13.9  11.5 - 14.5 % Final    Platelets 01/31/2025 163  150 - 450 K/uL Final    MPV 01/31/2025 9.1 (L)  9.2 - 12.9 fL Final    Immature Granulocytes 01/31/2025 0.8 (H)  0.0 - 0.5 % Final    Gran # (ANC) 01/31/2025 8.7 (H)  1.8 - 7.7 K/uL Final    Immature Grans (Abs) 01/31/2025 0.10 (H)  0.00 - 0.04 K/uL Final    Lymph # 01/31/2025 3.5  1.0 - 4.8 K/uL Final    Mono # 01/31/2025 0.8  0.3 - 1.0 K/uL Final    Eos # 01/31/2025 0.2  0.0 - 0.5 K/uL Final    Baso # 01/31/2025 0.05  0.00 - 0.20 K/uL Final    nRBC 01/31/2025 0  0 /100 WBC Final    Gran % 01/31/2025 65.0  38.0 - 73.0 % Final    Lymph % 01/31/2025 26.2  18.0 - 48.0 % Final    Mono % 01/31/2025 5.9  4.0 - 15.0 % Final    Eosinophil % 01/31/2025 1.7  0.0 - 8.0 % Final    Basophil % 01/31/2025 0.4  0.0 - 1.9 % Final    Differential Method 01/31/2025 Automated   Final    Sodium 01/31/2025 138  136 - 145 mmol/L Final    Potassium 01/31/2025 3.2 (L)  3.5 - 5.1 mmol/L Final    Chloride 01/31/2025 108  95 - 110 mmol/L Final    CO2 01/31/2025 23  23 - 29 mmol/L Final    Glucose 01/31/2025 82  70 - 110 mg/dL Final    BUN 01/31/2025 3 (L)  6 - 20 mg/dL Final    Creatinine 01/31/2025 0.5  0.5 - 1.4 mg/dL Final    Calcium 01/31/2025 8.2 (L)  8.7 - 10.5 mg/dL Final    Total Protein 01/31/2025 4.7 (L)  6.0 - 8.4 g/dL  "Final    Albumin 01/31/2025 2.7 (L)  3.5 - 5.2 g/dL Final    Total Bilirubin 01/31/2025 0.2  0.1 - 1.0 mg/dL Final    Alkaline Phosphatase 01/31/2025 83  55 - 135 U/L Final    AST 01/31/2025 17  10 - 40 U/L Final    ALT 01/31/2025 8 (L)  10 - 44 U/L Final    eGFR 01/31/2025 >60.0  >60 mL/min/1.73 m^2 Final    Anion Gap 01/31/2025 7 (L)  8 - 16 mmol/L Final       History reviewed. No pertinent past medical history.  Social History[1]  Past Surgical History:   Procedure Laterality Date    TONSILLECTOMY      WISDOM TOOTH EXTRACTION       No family history on file.    The CVD Risk score (CHLOE'Agoino, et al., 2008) failed to calculate for the following reasons:    Cannot find a previous HDL lab    Cannot find a previous total cholesterol lab    All of your core healthy metrics are met.      Review of patient's allergies indicates:  No Known Allergies  Current Medications[2]    Review of Systems        Objective:      Vitals:    07/01/25 1040   BP: 100/68   Pulse: 76   SpO2: 99%   Weight: 77.1 kg (170 lb)   Height: 5' 4" (1.626 m)     Physical Exam  Vitals and nursing note reviewed.   Constitutional:       General: She is not in acute distress.     Appearance: Normal appearance. She is well-developed. She is not ill-appearing.   HENT:      Head: Normocephalic and atraumatic.      Right Ear: External ear normal.      Left Ear: External ear normal.      Nose: Nose normal.      Mouth/Throat:      Lips: Pink.      Pharynx: Oropharynx is clear.   Eyes:      General: No scleral icterus.     Pupils: Pupils are equal, round, and reactive to light.   Neck:      Thyroid: No thyromegaly.      Vascular: No carotid bruit.   Cardiovascular:      Rate and Rhythm: Normal rate and regular rhythm.      Pulses:           Radial pulses are 2+ on the right side and 2+ on the left side.      Heart sounds: Normal heart sounds. No murmur heard.  Pulmonary:      Effort: Pulmonary effort is normal.      Breath sounds: Normal breath sounds. "   Abdominal:      General: Bowel sounds are normal.      Palpations: Abdomen is soft.      Tenderness: There is no abdominal tenderness.   Musculoskeletal:         General: Normal range of motion.      Cervical back: Normal range of motion.      Lumbar back: Normal. No spasms.      Right lower leg: No edema.      Left lower leg: No edema.   Skin:     General: Skin is warm and dry.      Capillary Refill: Capillary refill takes less than 2 seconds.   Neurological:      General: No focal deficit present.      Mental Status: She is alert and oriented to person, place, and time.      Cranial Nerves: No cranial nerve deficit.      Sensory: Sensation is intact.      Motor: No weakness.      Gait: Gait is intact.   Psychiatric:         Attention and Perception: Attention normal.         Speech: Speech normal.         Behavior: Behavior is cooperative.         Thought Content: Thought content does not include homicidal or suicidal ideation.      Comments: Tearful today                    Assessment:       1. Hypertension, unspecified type    2. Anxiety         Plan:           GENERALIZED ANXIETY DISORDER:  - Evaluated patient's mental health, noting anxiety and rage episodes particularly when her  works night shifts.  - Kelsey reports feeling overstimulated and guilty about time spent with children, with fluctuating good and bad days.  - She acknowledges feeling anxious and angry, but not toward the baby.  - Discussed how stress affects brain chemistry and validated her feelings, noting the increasing prevalence and normalization of mental health treatment.  - Considered counseling options, acknowledging her time constraints.  - Initiated sertraline (Zoloft) 25 mg tablets: Starting with 12.5 mg (half tablet) daily for first week, then increasing to 25 mg daily.  - Explained SSRI mechanism of action, potential side effects including initial drowsiness and possible weight gain.  - Reviewed alternative options (e.g.,  escitalopram) if sertraline is not tolerated.  - Kelsey to monitor emotional state and any medication side effects.    HYPERTENSION:  - Assessed BP control on current regimen.  - Decreased labetalol from 200 mg twice daily to 100 mg twice daily.  - Kelsey to monitor BP at home, aiming to maintain readings below 130/80.    FOLLOW-UP:  - Contact office in 1 week to report BP readings and emotional state.  - Use patient portal to send message about medication effects and BP readings.         Follow up in about 6 months (around 1/1/2026) for HTN.        This note was generated with the assistance of ambient listening technology. Verbal consent was obtained by the patient and accompanying visitor(s) for the recording of patient appointment to facilitate this note. I attest to having reviewed and edited the generated note for accuracy, though some syntax or spelling errors may persist. Please contact the author of this note for any clarification.      7/13/2025 Niurka Reese         [1]   Social History  Socioeconomic History    Marital status:    Tobacco Use    Smoking status: Never    Smokeless tobacco: Never   Substance and Sexual Activity    Alcohol use: Not Currently    Drug use: Never    Sexual activity: Yes     Partners: Male     Social Drivers of Health     Financial Resource Strain: Low Risk  (2/5/2025)    Overall Financial Resource Strain (CARDIA)     Difficulty of Paying Living Expenses: Not hard at all   Food Insecurity: No Food Insecurity (2/5/2025)    Hunger Vital Sign     Worried About Running Out of Food in the Last Year: Never true     Ran Out of Food in the Last Year: Never true   Transportation Needs: No Transportation Needs (2/5/2025)    TRANSPORTATION NEEDS     Transportation : No   Stress: No Stress Concern Present (2/5/2025)    [employer] [place] [employer] - Occupational Stress Questionnaire     Feeling of Stress : Not at all   Housing Stability: Unknown (2/5/2025)    Housing  Stability Vital Sign     Unable to Pay for Housing in the Last Year: No     Homeless in the Last Year: No   [2]   Current Outpatient Medications:     ibuprofen (ADVIL,MOTRIN) 800 MG tablet, Take 1 tablet (800 mg total) by mouth every 6 (six) hours as needed (cramping)., Disp: 60 tablet, Rfl: 0    labetaloL (NORMODYNE) 200 MG tablet, Take 1 tablet (200 mg total) by mouth 2 (two) times daily., Disp: 180 tablet, Rfl: 1    multivitamin (ONE DAILY MULTIVITAMIN) per tablet, Take 1 tablet by mouth once daily., Disp: , Rfl:     prenatal no115/iron/folic acid (PRENATAL 19 ORAL), Take 1 capsule by mouth Daily., Disp: , Rfl:     PREVIDENT 5000 BOOSTER PLUS 1.1 % Pste, every evening., Disp: , Rfl:     fluconazole (DIFLUCAN) 150 MG Tab, Take one pill today and one in one week. (Patient not taking: Reported on 7/1/2025), Disp: 2 tablet, Rfl: 0    sertraline (ZOLOFT) 25 MG tablet, For a week, take half tablet 12.5mg each morning, then start 25mg each morning thereafter, Disp: 30 tablet, Rfl: 11

## 2025-07-21 ENCOUNTER — PATIENT MESSAGE (OUTPATIENT)
Dept: FAMILY MEDICINE | Facility: CLINIC | Age: 30
End: 2025-07-21
Payer: COMMERCIAL

## 2025-07-21 DIAGNOSIS — I10 HYPERTENSION, UNSPECIFIED TYPE: Primary | ICD-10-CM

## 2025-07-21 RX ORDER — LABETALOL 100 MG/1
100 TABLET, FILM COATED ORAL 2 TIMES DAILY
Qty: 60 TABLET | Refills: 11 | Status: SHIPPED | OUTPATIENT
Start: 2025-07-21 | End: 2026-07-21